# Patient Record
Sex: MALE | Race: WHITE | NOT HISPANIC OR LATINO | URBAN - METROPOLITAN AREA
[De-identification: names, ages, dates, MRNs, and addresses within clinical notes are randomized per-mention and may not be internally consistent; named-entity substitution may affect disease eponyms.]

---

## 2017-08-02 ENCOUNTER — EMERGENCY (EMERGENCY)
Facility: HOSPITAL | Age: 47
LOS: 0 days | Discharge: HOME | End: 2017-08-02
Admitting: INTERNAL MEDICINE

## 2017-08-02 DIAGNOSIS — S51.852A OPEN BITE OF LEFT FOREARM, INITIAL ENCOUNTER: ICD-10-CM

## 2017-08-02 DIAGNOSIS — Z23 ENCOUNTER FOR IMMUNIZATION: ICD-10-CM
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
## 2017-08-02 DIAGNOSIS — Z98.890 OTHER SPECIFIED POSTPROCEDURAL STATES: ICD-10-CM
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
## 2017-08-02 DIAGNOSIS — M62.82 RHABDOMYOLYSIS: ICD-10-CM

## 2017-08-02 DIAGNOSIS — J44.1 CHRONIC OBSTRUCTIVE PULMONARY DISEASE WITH (ACUTE) EXACERBATION: ICD-10-CM

## 2017-08-02 DIAGNOSIS — N17.9 ACUTE KIDNEY FAILURE, UNSPECIFIED: ICD-10-CM

## 2017-09-10 ENCOUNTER — INPATIENT (INPATIENT)
Facility: HOSPITAL | Age: 47
LOS: 0 days | Discharge: LEFT BEFORE DISCHARGE | End: 2017-09-10
Attending: INTERNAL MEDICINE

## 2017-09-10 DIAGNOSIS — N17.9 ACUTE KIDNEY FAILURE, UNSPECIFIED: ICD-10-CM

## 2017-09-10 DIAGNOSIS — J44.1 CHRONIC OBSTRUCTIVE PULMONARY DISEASE WITH (ACUTE) EXACERBATION: ICD-10-CM

## 2017-09-10 DIAGNOSIS — M62.82 RHABDOMYOLYSIS: ICD-10-CM

## 2017-09-19 DIAGNOSIS — J40 BRONCHITIS, NOT SPECIFIED AS ACUTE OR CHRONIC: ICD-10-CM

## 2017-09-19 DIAGNOSIS — J44.1 CHRONIC OBSTRUCTIVE PULMONARY DISEASE WITH (ACUTE) EXACERBATION: ICD-10-CM

## 2017-09-19 DIAGNOSIS — E78.5 HYPERLIPIDEMIA, UNSPECIFIED: ICD-10-CM

## 2017-09-19 DIAGNOSIS — K21.9 GASTRO-ESOPHAGEAL REFLUX DISEASE WITHOUT ESOPHAGITIS: ICD-10-CM

## 2017-09-19 DIAGNOSIS — G47.33 OBSTRUCTIVE SLEEP APNEA (ADULT) (PEDIATRIC): ICD-10-CM

## 2017-09-19 DIAGNOSIS — E11.9 TYPE 2 DIABETES MELLITUS WITHOUT COMPLICATIONS: ICD-10-CM

## 2017-09-19 DIAGNOSIS — F17.200 NICOTINE DEPENDENCE, UNSPECIFIED, UNCOMPLICATED: ICD-10-CM

## 2017-09-19 PROBLEM — Z00.00 ENCOUNTER FOR PREVENTIVE HEALTH EXAMINATION: Status: ACTIVE | Noted: 2017-09-19

## 2017-11-29 ENCOUNTER — EMERGENCY (EMERGENCY)
Facility: HOSPITAL | Age: 47
LOS: 0 days | Discharge: HOME | End: 2017-11-29

## 2017-11-29 DIAGNOSIS — J44.9 CHRONIC OBSTRUCTIVE PULMONARY DISEASE, UNSPECIFIED: ICD-10-CM

## 2017-11-29 DIAGNOSIS — Z48.02 ENCOUNTER FOR REMOVAL OF SUTURES: ICD-10-CM

## 2017-11-29 DIAGNOSIS — M62.82 RHABDOMYOLYSIS: ICD-10-CM
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
## 2017-11-29 DIAGNOSIS — S01.81XD LACERATION WITHOUT FOREIGN BODY OF OTHER PART OF HEAD, SUBSEQUENT ENCOUNTER: ICD-10-CM

## 2017-11-29 DIAGNOSIS — J44.1 CHRONIC OBSTRUCTIVE PULMONARY DISEASE WITH (ACUTE) EXACERBATION: ICD-10-CM

## 2017-11-29 DIAGNOSIS — Z87.891 PERSONAL HISTORY OF NICOTINE DEPENDENCE: ICD-10-CM

## 2017-11-29 DIAGNOSIS — Z98.890 OTHER SPECIFIED POSTPROCEDURAL STATES: ICD-10-CM

## 2017-11-29 DIAGNOSIS — N17.9 ACUTE KIDNEY FAILURE, UNSPECIFIED: ICD-10-CM

## 2017-11-29 DIAGNOSIS — Z88.0 ALLERGY STATUS TO PENICILLIN: ICD-10-CM

## 2017-12-03 ENCOUNTER — EMERGENCY (EMERGENCY)
Facility: HOSPITAL | Age: 47
LOS: 0 days | Discharge: HOME | End: 2017-12-03
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
## 2017-12-03 DIAGNOSIS — H11.412 VASCULAR ABNORMALITIES OF CONJUNCTIVA, LEFT EYE: ICD-10-CM

## 2017-12-03 DIAGNOSIS — T15.02XA FOREIGN BODY IN CORNEA, LEFT EYE, INITIAL ENCOUNTER: ICD-10-CM

## 2017-12-03 DIAGNOSIS — H57.12 OCULAR PAIN, LEFT EYE: ICD-10-CM

## 2017-12-03 DIAGNOSIS — J44.1 CHRONIC OBSTRUCTIVE PULMONARY DISEASE WITH (ACUTE) EXACERBATION: ICD-10-CM

## 2017-12-03 DIAGNOSIS — Z87.891 PERSONAL HISTORY OF NICOTINE DEPENDENCE: ICD-10-CM

## 2017-12-03 DIAGNOSIS — Z88.0 ALLERGY STATUS TO PENICILLIN: ICD-10-CM

## 2017-12-03 DIAGNOSIS — J44.9 CHRONIC OBSTRUCTIVE PULMONARY DISEASE, UNSPECIFIED: ICD-10-CM

## 2017-12-03 DIAGNOSIS — Z98.890 OTHER SPECIFIED POSTPROCEDURAL STATES: ICD-10-CM
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
## 2017-12-03 DIAGNOSIS — N17.9 ACUTE KIDNEY FAILURE, UNSPECIFIED: ICD-10-CM

## 2017-12-03 DIAGNOSIS — M62.82 RHABDOMYOLYSIS: ICD-10-CM
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
## 2018-03-04 ENCOUNTER — EMERGENCY (EMERGENCY)
Facility: HOSPITAL | Age: 48
LOS: 0 days | Discharge: HOME | End: 2018-03-04
Admitting: INTERNAL MEDICINE

## 2018-03-04 VITALS
RESPIRATION RATE: 18 BRPM | DIASTOLIC BLOOD PRESSURE: 78 MMHG | TEMPERATURE: 98 F | OXYGEN SATURATION: 98 % | SYSTOLIC BLOOD PRESSURE: 134 MMHG | HEART RATE: 97 BPM
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
## 2018-03-04 DIAGNOSIS — H57.12 OCULAR PAIN, LEFT EYE: ICD-10-CM

## 2018-03-04 DIAGNOSIS — T15.92XA FOREIGN BODY ON EXTERNAL EYE, PART UNSPECIFIED, LEFT EYE, INITIAL ENCOUNTER: ICD-10-CM
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
## 2018-03-04 DIAGNOSIS — Z88.0 ALLERGY STATUS TO PENICILLIN: ICD-10-CM

## 2018-03-04 DIAGNOSIS — J44.9 CHRONIC OBSTRUCTIVE PULMONARY DISEASE, UNSPECIFIED: ICD-10-CM

## 2018-03-04 RX ORDER — OFLOXACIN 0.3 %
1 DROPS OPHTHALMIC (EYE) ONCE
Qty: 0 | Refills: 0 | Status: COMPLETED | OUTPATIENT
Start: 2018-03-04 | End: 2018-03-04

## 2018-03-04 RX ADMIN — Medication 1 DROP(S): at 20:37

## 2018-03-04 NOTE — ED ADULT TRIAGE NOTE - CHIEF COMPLAINT QUOTE
pt sts around abhi time he was cutting meta bleachers and had pieces of metal go into his left eye which where removed by eye doctor but the past two days the left eye is red again and has been hurting him.

## 2018-03-04 NOTE — ED PROVIDER NOTE - MEDICAL DECISION MAKING DETAILS
Patient c/o left eye FB sensation, + FB in cornea, unable to scrap off. 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 Will follow up with optho tomorrow morning. Understands return precautions. VA at baseline. Patient given abx eye drops.

## 2018-03-04 NOTE — ED PROVIDER NOTE - NS ED ROS FT
Constitutional: no fever, chills   Eyes: + left eye FB sensation, erythema, and sensitivity to light.  no visual changes, no eye pain, no discharge. no eye trauma. + h/o multiple FB left eye  ENT: no URI sxs, no throat pain  Cardiovascular: no chest pain, no sob  Respiratory: no cough, no shortness of breath,  Gastrointestinal: no nausea, vomiting  Integumentary: no rash or skin changes. no edema  Neurological: no headache, no dizziness, no visual changes, no UE/LE weakness or paresthesias.

## 2018-03-04 NOTE — ED PROVIDER NOTE - OBJECTIVE STATEMENT
46 yo male with h/o COPD presents to the ED c/o left eye FB sensation x 2 days. Patient states he has a complicated history of his left eye - he had metal FBs in December, requiring multiple procedures - states retrained metal is still in his eye. Patient was suppose to follow up with optho but hasn't followed up. Patient c/o eye redness, sensitivity to light and FB sensation left eye. no trauma or injury to eye. Denies fever, chills, headache, dizziness, visual changes (from baseline) UE/LE weakness or paresthesias. baseline VA 20/80 left eye.

## 2018-03-04 NOTE — ED PROVIDER NOTE - PROGRESS NOTE DETAILS
Patient understands important of follow up with optho tomorrow morning. Understands return precautions.

## 2018-03-04 NOTE — ED PROVIDER NOTE - PHYSICAL EXAMINATION
GENERAL:  well appearing, non-toxic male in no acute distress  SKIN: skin warm, pink and dry. MMM.   HEAD: NC, AT  EYE: Normal lids. PERRL, EOMI. no pain with eye movement. + erythema to sclera. + FB of cornea. VA 20/40 right eye, 20/70 left eye. eye anesthesized and stained, + uptake surrounding FB. neg wilmer sign. eye lids everted, no FB under eyelids.   ENT:  Airway intact. Patent oropharynx without erythema or exudate.  NECK: Neck supple. No midline cervical tenderness, meningismus, or JVD. Trachea midline  PULM: CTAB. Normal respiratory effort. No respiratory distress. No wheezes, stridor, rales or rhonchi. No retractions  CV: RRR, no M/R/G.   NEURO: A+Ox3, no sensory/motor deficits, CN II-XII intact.

## 2018-03-05 ENCOUNTER — EMERGENCY (EMERGENCY)
Facility: HOSPITAL | Age: 48
LOS: 0 days | Discharge: HOME | End: 2018-03-05
Attending: EMERGENCY MEDICINE | Admitting: INTERNAL MEDICINE

## 2018-03-05 VITALS
DIASTOLIC BLOOD PRESSURE: 79 MMHG | SYSTOLIC BLOOD PRESSURE: 130 MMHG | OXYGEN SATURATION: 97 % | HEART RATE: 104 BPM | RESPIRATION RATE: 18 BRPM | TEMPERATURE: 99 F
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
## 2018-03-05 DIAGNOSIS — T15.02XA FOREIGN BODY IN CORNEA, LEFT EYE, INITIAL ENCOUNTER: ICD-10-CM
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
## 2018-03-05 DIAGNOSIS — Z88.0 ALLERGY STATUS TO PENICILLIN: ICD-10-CM

## 2018-03-05 DIAGNOSIS — H57.12 OCULAR PAIN, LEFT EYE: ICD-10-CM

## 2018-03-05 RX ORDER — FLUORESCEIN SODIUM 9 MG
1 STRIP OPHTHALMIC (EYE) ONCE
Qty: 0 | Refills: 0 | Status: COMPLETED | OUTPATIENT
Start: 2018-03-05 | End: 2018-03-05

## 2018-03-05 RX ADMIN — Medication 1 APPLICATION(S): at 15:57

## 2018-03-05 RX ADMIN — Medication 1 DROP(S): at 16:20

## 2018-03-05 NOTE — ED PROVIDER NOTE - OBJECTIVE STATEMENT
48 yo M w/ a hx of COPD presents to the ED c/o left eye FB sensation x 3 days. Patient states he has a complicated history of his left eye - he had metal FBs in December, requiring multiple procedures - states retained metal is still in his eye. Patient was suppose to follow up with optho at [facility] to have it removed but never did because the pain went away until 3 days ago. Patient c/o eye redness, sensitivity to light and FB sensation left eye. no trauma or injury to eye. He was evaluated here last night that confirmed these findings and was told to f/u at ophtho clinic but patient returned here instead. Denies fever, chills, headache, dizziness, visual changes.

## 2018-03-05 NOTE — ED PROVIDER NOTE - ATTENDING CONTRIBUTION TO CARE
Pt is a 46yo male with L eye foreign body since December that was partially removed and he was to follow up with [facility] but he didn't.  3d ago started getting pain to same eye.  Came to ED last night and got abx.     Exam: L eye retained body, EOMI, neg Stephan  Imp: foreign body  Plan: ophtho f/u

## 2018-03-05 NOTE — ED PROVIDER NOTE - NS ED ROS FT
Constitutional: See HPI.  Eyes: + eye pain . Nodischarge.  ENMT: No hearing changes, pain, discharge or infections. No neck pain or stiffness.  Neuro: No weakness. No LOC.  Skin: No skin rash.

## 2018-03-05 NOTE — ED PROVIDER NOTE - PROGRESS NOTE DETAILS
[facility]. Dr. Campos unable to see him today, patient can be seen first thing in the AM at 8:30. This was relayed to the patient and patient understood that he is to go straight to the clinic tomorrow morning and NOT the ED

## 2018-03-05 NOTE — ED PROVIDER NOTE - PHYSICAL EXAMINATION
AOx4, Non toxic appearing, NAD, speaking in full sentences. Skin  warm and dry, no acute rash. Head normocephalic, atraumatic. PERRLA/EOMI, conjunctiva and sclera clear on right, left conjunctiva injected, on stain - FB seen in 9-10 o clock position with negative seidels.

## 2018-03-06 ENCOUNTER — OUTPATIENT (OUTPATIENT)
Dept: OUTPATIENT SERVICES | Facility: HOSPITAL | Age: 48
LOS: 1 days | Discharge: HOME | End: 2018-03-06

## 2018-03-09 ENCOUNTER — OUTPATIENT (OUTPATIENT)
Dept: OUTPATIENT SERVICES | Facility: HOSPITAL | Age: 48
LOS: 1 days | Discharge: HOME | End: 2018-03-09

## 2018-03-16 ENCOUNTER — OUTPATIENT (OUTPATIENT)
Dept: OUTPATIENT SERVICES | Facility: HOSPITAL | Age: 48
LOS: 1 days | Discharge: HOME | End: 2018-03-16

## 2018-03-20 ENCOUNTER — OUTPATIENT (OUTPATIENT)
Dept: OUTPATIENT SERVICES | Facility: HOSPITAL | Age: 48
LOS: 1 days | Discharge: HOME | End: 2018-03-20

## 2018-03-23 ENCOUNTER — OUTPATIENT (OUTPATIENT)
Dept: OUTPATIENT SERVICES | Facility: HOSPITAL | Age: 48
LOS: 1 days | Discharge: HOME | End: 2018-03-23

## 2018-03-29 ENCOUNTER — OUTPATIENT (OUTPATIENT)
Dept: OUTPATIENT SERVICES | Facility: HOSPITAL | Age: 48
LOS: 1 days | Discharge: HOME | End: 2018-03-29

## 2018-04-03 ENCOUNTER — OUTPATIENT (OUTPATIENT)
Dept: OUTPATIENT SERVICES | Facility: HOSPITAL | Age: 48
LOS: 1 days | Discharge: HOME | End: 2018-04-03

## 2018-04-10 ENCOUNTER — OUTPATIENT (OUTPATIENT)
Dept: OUTPATIENT SERVICES | Facility: HOSPITAL | Age: 48
LOS: 1 days | Discharge: HOME | End: 2018-04-10

## 2018-04-17 ENCOUNTER — OUTPATIENT (OUTPATIENT)
Dept: OUTPATIENT SERVICES | Facility: HOSPITAL | Age: 48
LOS: 1 days | Discharge: HOME | End: 2018-04-17

## 2018-04-17 DIAGNOSIS — H18.223 IDIOPATHIC CORNEAL EDEMA, BILATERAL: ICD-10-CM

## 2018-04-17 DIAGNOSIS — H18.792 OTHER CORNEAL DEFORMITIES, LEFT EYE: ICD-10-CM

## 2018-04-24 ENCOUNTER — OUTPATIENT (OUTPATIENT)
Dept: OUTPATIENT SERVICES | Facility: HOSPITAL | Age: 48
LOS: 1 days | Discharge: HOME | End: 2018-04-24

## 2018-05-08 ENCOUNTER — OUTPATIENT (OUTPATIENT)
Dept: OUTPATIENT SERVICES | Facility: HOSPITAL | Age: 48
LOS: 1 days | Discharge: HOME | End: 2018-05-08

## 2018-05-11 NOTE — ED ADULT NURSE NOTE - EXTENSIONS OF SELF_ADULT
Medical Necessity Clause: This procedure was medically necessary because the lesions that were treated were: Medical Necessity Information: It is in your best interest to select a reason for this procedure from the list below. All of these items fulfill various CMS LCD requirements except the new and changing color options. Render Post-Care Instructions In Note?: no Consent: The patient's consent was obtained including but not limited to risks of crusting, scabbing, blistering, scarring, darker or lighter pigmentary change, recurrence, incomplete removal and infection. Detail Level: Detailed None Post-Care Instructions: I reviewed with the patient in detail post-care instructions. Patient is to wear sunprotection, and avoid picking at any of the treated lesions. Pt may apply Vaseline to crusted or scabbing areas. Size Of Lesion In Cm (Optional): 0 Detail Level: Zone

## 2018-05-19 ENCOUNTER — EMERGENCY (EMERGENCY)
Facility: HOSPITAL | Age: 48
LOS: 0 days | Discharge: HOME | End: 2018-05-20
Attending: EMERGENCY MEDICINE | Admitting: EMERGENCY MEDICINE

## 2018-05-19 VITALS
RESPIRATION RATE: 20 BRPM | TEMPERATURE: 99 F | OXYGEN SATURATION: 94 % | DIASTOLIC BLOOD PRESSURE: 60 MMHG | HEART RATE: 104 BPM | SYSTOLIC BLOOD PRESSURE: 119 MMHG

## 2018-05-19 DIAGNOSIS — R11.10 VOMITING, UNSPECIFIED: ICD-10-CM

## 2018-05-19 DIAGNOSIS — R06.02 SHORTNESS OF BREATH: ICD-10-CM

## 2018-05-19 DIAGNOSIS — Z88.0 ALLERGY STATUS TO PENICILLIN: ICD-10-CM

## 2018-05-19 DIAGNOSIS — F17.200 NICOTINE DEPENDENCE, UNSPECIFIED, UNCOMPLICATED: ICD-10-CM

## 2018-05-19 DIAGNOSIS — J44.1 CHRONIC OBSTRUCTIVE PULMONARY DISEASE WITH (ACUTE) EXACERBATION: ICD-10-CM

## 2018-05-20 LAB
ALBUMIN SERPL ELPH-MCNC: 3.8 G/DL — SIGNIFICANT CHANGE UP (ref 3.5–5.2)
ALP SERPL-CCNC: 87 U/L — SIGNIFICANT CHANGE UP (ref 30–115)
ALT FLD-CCNC: 23 U/L — SIGNIFICANT CHANGE UP (ref 0–41)
ANION GAP SERPL CALC-SCNC: 12 MMOL/L — SIGNIFICANT CHANGE UP (ref 7–14)
AST SERPL-CCNC: 25 U/L — SIGNIFICANT CHANGE UP (ref 0–41)
BASOPHILS # BLD AUTO: 0.04 K/UL — SIGNIFICANT CHANGE UP (ref 0–0.2)
BASOPHILS NFR BLD AUTO: 0.4 % — SIGNIFICANT CHANGE UP (ref 0–1)
BILIRUB SERPL-MCNC: 0.3 MG/DL — SIGNIFICANT CHANGE UP (ref 0.2–1.2)
BUN SERPL-MCNC: 21 MG/DL — HIGH (ref 10–20)
CALCIUM SERPL-MCNC: 8.6 MG/DL — SIGNIFICANT CHANGE UP (ref 8.5–10.1)
CHLORIDE SERPL-SCNC: 99 MMOL/L — SIGNIFICANT CHANGE UP (ref 98–110)
CK MB CFR SERPL CALC: 6.2 NG/ML — SIGNIFICANT CHANGE UP (ref 0.6–6.3)
CK SERPL-CCNC: 626 U/L — HIGH (ref 0–225)
CO2 SERPL-SCNC: 26 MMOL/L — SIGNIFICANT CHANGE UP (ref 17–32)
CREAT SERPL-MCNC: 0.9 MG/DL — SIGNIFICANT CHANGE UP (ref 0.7–1.5)
EOSINOPHIL # BLD AUTO: 0.16 K/UL — SIGNIFICANT CHANGE UP (ref 0–0.7)
EOSINOPHIL NFR BLD AUTO: 1.6 % — SIGNIFICANT CHANGE UP (ref 0–8)
GLUCOSE SERPL-MCNC: 113 MG/DL — HIGH (ref 70–99)
HCT VFR BLD CALC: 45.4 % — SIGNIFICANT CHANGE UP (ref 42–52)
HGB BLD-MCNC: 15 G/DL — SIGNIFICANT CHANGE UP (ref 14–18)
IMM GRANULOCYTES NFR BLD AUTO: 0.4 % — HIGH (ref 0.1–0.3)
LYMPHOCYTES # BLD AUTO: 1.9 K/UL — SIGNIFICANT CHANGE UP (ref 1.2–3.4)
LYMPHOCYTES # BLD AUTO: 19.6 % — LOW (ref 20.5–51.1)
MCHC RBC-ENTMCNC: 29.4 PG — SIGNIFICANT CHANGE UP (ref 27–31)
MCHC RBC-ENTMCNC: 33 G/DL — SIGNIFICANT CHANGE UP (ref 32–37)
MCV RBC AUTO: 89 FL — SIGNIFICANT CHANGE UP (ref 80–94)
MONOCYTES # BLD AUTO: 0.91 K/UL — HIGH (ref 0.1–0.6)
MONOCYTES NFR BLD AUTO: 9.4 % — HIGH (ref 1.7–9.3)
NEUTROPHILS # BLD AUTO: 6.66 K/UL — HIGH (ref 1.4–6.5)
NEUTROPHILS NFR BLD AUTO: 68.6 % — SIGNIFICANT CHANGE UP (ref 42.2–75.2)
NRBC # BLD: 0 /100 WBCS — SIGNIFICANT CHANGE UP (ref 0–0)
PLATELET # BLD AUTO: 204 K/UL — SIGNIFICANT CHANGE UP (ref 130–400)
POTASSIUM SERPL-MCNC: 4.4 MMOL/L — SIGNIFICANT CHANGE UP (ref 3.5–5)
POTASSIUM SERPL-SCNC: 4.4 MMOL/L — SIGNIFICANT CHANGE UP (ref 3.5–5)
PROT SERPL-MCNC: 6.7 G/DL — SIGNIFICANT CHANGE UP (ref 6–8)
RBC # BLD: 5.1 M/UL — SIGNIFICANT CHANGE UP (ref 4.7–6.1)
RBC # FLD: 13.3 % — SIGNIFICANT CHANGE UP (ref 11.5–14.5)
SODIUM SERPL-SCNC: 137 MMOL/L — SIGNIFICANT CHANGE UP (ref 135–146)
TROPONIN T SERPL-MCNC: <0.01 NG/ML — SIGNIFICANT CHANGE UP
WBC # BLD: 9.71 K/UL — SIGNIFICANT CHANGE UP (ref 4.8–10.8)
WBC # FLD AUTO: 9.71 K/UL — SIGNIFICANT CHANGE UP (ref 4.8–10.8)

## 2018-05-20 RX ORDER — ALBUTEROL 90 UG/1
1 AEROSOL, METERED ORAL ONCE
Qty: 0 | Refills: 0 | Status: COMPLETED | OUTPATIENT
Start: 2018-05-20 | End: 2018-05-20

## 2018-05-20 RX ORDER — IPRATROPIUM/ALBUTEROL SULFATE 18-103MCG
3 AEROSOL WITH ADAPTER (GRAM) INHALATION
Qty: 0 | Refills: 0 | Status: COMPLETED | OUTPATIENT
Start: 2018-05-20 | End: 2018-05-20

## 2018-05-20 RX ORDER — DEXAMETHASONE 0.5 MG/5ML
10 ELIXIR ORAL ONCE
Qty: 0 | Refills: 0 | Status: COMPLETED | OUTPATIENT
Start: 2018-05-20 | End: 2018-05-20

## 2018-05-20 RX ADMIN — Medication 3 MILLILITER(S): at 01:20

## 2018-05-20 RX ADMIN — Medication 3 MILLILITER(S): at 01:35

## 2018-05-20 RX ADMIN — ALBUTEROL 1 PUFF(S): 90 AEROSOL, METERED ORAL at 04:01

## 2018-05-20 RX ADMIN — Medication 10 MILLIGRAM(S): at 01:20

## 2018-05-20 RX ADMIN — Medication 3 MILLILITER(S): at 01:13

## 2018-05-20 NOTE — ED PROVIDER NOTE - OBJECTIVE STATEMENT
48y M w [facility] COPD diagnosed 6 mo ago presents for 2 wks progressively worsening SOB. Had been well controlled at home with daily ipratropium/albuterol nebulizer treatments for which he used his  grandmother's nebulizer. Yesterday, the nebulizer broke and he is still having trouble breathing. +persistent cough with occasional white sputum and 3 episodes of posttussive vomiting.   No nausea or fever.   States that his albuterol inhaler ran out.  No pulmonologist.

## 2018-05-20 NOTE — ED PROVIDER NOTE - MEDICAL DECISION MAKING DETAILS
48m w COPD exacerbation. Labs and imaging reviewed. Feeling better after treatment. Pt advised regarding symptomatic/supportive care, importance of PMD/Pulmonary f/u, and symptoms to prompt ED return.

## 2018-05-20 NOTE — ED PROVIDER NOTE - NS ED ROS FT
Constitutional: No fever or chills. Decreased appetite. No unintended weight loss.   Eyes: No vision changes.  ENT: No ear pain. No sore throat.  Neck: No neck pain or stiffness.  Cardiovascular: No chest pain, palpitations, or edema.  Pulmonary: No hemoptysis.  Abdominal: No abdominal pain, nausea, vomiting, or diarrhea.   Neuro: No headache, syncope, or dizziness.  MS: No back pain. No leg swelling or calf pain.   Psych: No suicidal or homicidal ideations.

## 2018-05-20 NOTE — ED PROVIDER NOTE - PHYSICAL EXAMINATION
Constitutional: Well developed, well nourished. NAD. Good general hygiene  Head: Atraumatic.  Eyes: EOMI without discomfort.   ENT: No nasal discharge. Mucous membranes moist.  Neck: Supple. Painless ROM.  Cardiovascular: Regular rhythm. Regular rate. Normal S1 and S2. No murmurs. 2+ pulses in all extremities.   Pulmonary: Increased respiratory rate and effort. Diffuse wheezes bilaterally without rales or rhonchi. Bilateral, equal lung expansion.   Abdominal: Soft. Nondistended. Nontender. No rebound or guarding.   Extremities. Ambulatory.  Skin: No rashes.   Neuro: AAOx3. No focal neurological deficits.  Psych: Normal mood. Normal affect.

## 2018-05-20 NOTE — ED PROVIDER NOTE - ATTENDING CONTRIBUTION TO CARE
48m w a hx of COPD presents w 2 wks of progressively worsening dyspnea, wheezing, & cough w white sputum. Symptoms are constant, moderate, no exacerbating/alleviating. No recent travel/hosp/immobilization.    Review of Systems  Constitutional:  No fever or chills.  Eyes:  Negative.   ENMT:  No nasal congestion, discharge, or throat pain.   Cardiac:  No chest pain, syncope, or edema.  Respiratory:  +dyspnea, +wheezing, +cough. No hemoptysis.  GI:  No vomiting, diarrhea, or abdominal pain. No melena or hematochezia.  :  No dysuria or hematuria.   Musculoskeletal:  No gait abnormality, joint swelling, joint pain, or back pain.  Skin:  No skin rash, jaundice, or lesions.  Neuro:  No headache, loss of sensation, or focal weakness.  No change in mental status.     Physical Exam  General: Awake, alert, NAD, WDWN, non-toxic appearing, NCAT  Eyes: PERRL, EOMI, no icterus, lids and conjunctivae are normal  ENT: External inspection normal, pink/moist membranes, pharynx normal  CV: S1S2, regular rate and rhythm, no murmur/gallops/rubs, no JVD, 2+ pulses b/l, no edema/cords/homans, warm/well-perfused  Respiratory: Normal respiratory rate/effort, no respiratory distress, normal voice, speaking full sentences, lungs wheezing diffusely b/l, no rales/rhonchi, no retractions, no stridor  Abdomen: Soft abdomen, no tender/distended/guarding/rebound, no CVA tender  Musculoskeletal: FROM all 4 extremities, N/V intact, stable gait  Neck: FROM neck, supple, no meningismus, trachea midline, no JVD  Integumentary: Color normal for race, warm and dry, no rash  Neuro: Oriented x3, CN 2-12 grossly intact, normal motor, normal sensory  Psych: Oriented x3, mood normal, affect normal     48m w COPD exacerbation, nontoxic appearing, n/v intact. --CBC, CMP, EKG, enzymes, CXR. --Nebs, steroids, observe/re-assess.

## 2018-05-20 NOTE — ED ADULT NURSE NOTE - OBJECTIVE STATEMENT
pt presents to ed with hx of copd c/o sob states inhaler is not working, cough with white phlegm, pt states he is a + smoker for 35 years

## 2018-05-21 ENCOUNTER — INPATIENT (INPATIENT)
Facility: HOSPITAL | Age: 48
LOS: 1 days | Discharge: HOME | End: 2018-05-23
Attending: INTERNAL MEDICINE | Admitting: INTERNAL MEDICINE

## 2018-05-21 VITALS
OXYGEN SATURATION: 97 % | RESPIRATION RATE: 22 BRPM | DIASTOLIC BLOOD PRESSURE: 63 MMHG | TEMPERATURE: 98 F | HEART RATE: 110 BPM | SYSTOLIC BLOOD PRESSURE: 130 MMHG

## 2018-05-21 DIAGNOSIS — E66.01 MORBID (SEVERE) OBESITY DUE TO EXCESS CALORIES: ICD-10-CM

## 2018-05-21 DIAGNOSIS — Z96.659 PRESENCE OF UNSPECIFIED ARTIFICIAL KNEE JOINT: Chronic | ICD-10-CM

## 2018-05-21 DIAGNOSIS — R06.02 SHORTNESS OF BREATH: ICD-10-CM

## 2018-05-21 DIAGNOSIS — Z96.659 PRESENCE OF UNSPECIFIED ARTIFICIAL KNEE JOINT: ICD-10-CM

## 2018-05-21 DIAGNOSIS — F17.210 NICOTINE DEPENDENCE, CIGARETTES, UNCOMPLICATED: ICD-10-CM

## 2018-05-21 DIAGNOSIS — Z88.0 ALLERGY STATUS TO PENICILLIN: ICD-10-CM

## 2018-05-21 DIAGNOSIS — J44.1 CHRONIC OBSTRUCTIVE PULMONARY DISEASE WITH (ACUTE) EXACERBATION: ICD-10-CM

## 2018-05-21 LAB
ANION GAP SERPL CALC-SCNC: 13 MMOL/L — SIGNIFICANT CHANGE UP (ref 7–14)
APTT BLD: 20.7 SEC — CRITICAL LOW (ref 27–39.2)
BASE EXCESS BLDV CALC-SCNC: 3.1 MMOL/L — HIGH (ref -2–2)
BASOPHILS # BLD AUTO: 0.04 K/UL — SIGNIFICANT CHANGE UP (ref 0–0.2)
BASOPHILS NFR BLD AUTO: 0.4 % — SIGNIFICANT CHANGE UP (ref 0–1)
BUN SERPL-MCNC: 19 MG/DL — SIGNIFICANT CHANGE UP (ref 10–20)
CA-I SERPL-SCNC: 1.16 MMOL/L — SIGNIFICANT CHANGE UP (ref 1.12–1.3)
CALCIUM SERPL-MCNC: 8.4 MG/DL — LOW (ref 8.5–10.1)
CHLORIDE SERPL-SCNC: 103 MMOL/L — SIGNIFICANT CHANGE UP (ref 98–110)
CO2 SERPL-SCNC: 27 MMOL/L — SIGNIFICANT CHANGE UP (ref 17–32)
CREAT SERPL-MCNC: 0.9 MG/DL — SIGNIFICANT CHANGE UP (ref 0.7–1.5)
EOSINOPHIL # BLD AUTO: 0.05 K/UL — SIGNIFICANT CHANGE UP (ref 0–0.7)
EOSINOPHIL NFR BLD AUTO: 0.5 % — SIGNIFICANT CHANGE UP (ref 0–8)
GAS PNL BLDV: 140 MMOL/L — SIGNIFICANT CHANGE UP (ref 136–145)
GAS PNL BLDV: SIGNIFICANT CHANGE UP
GAS PNL BLDV: SIGNIFICANT CHANGE UP
GLUCOSE SERPL-MCNC: 104 MG/DL — HIGH (ref 70–99)
HCO3 BLDV-SCNC: 29 MMOL/L — SIGNIFICANT CHANGE UP (ref 22–29)
HCT VFR BLD CALC: 44.8 % — SIGNIFICANT CHANGE UP (ref 42–52)
HCT VFR BLDA CALC: 47.5 % — HIGH (ref 34–44)
HGB BLD CALC-MCNC: 15.5 G/DL — SIGNIFICANT CHANGE UP (ref 14–18)
HGB BLD-MCNC: 14.6 G/DL — SIGNIFICANT CHANGE UP (ref 14–18)
IMM GRANULOCYTES NFR BLD AUTO: 0.5 % — HIGH (ref 0.1–0.3)
INR BLD: 1.03 RATIO — SIGNIFICANT CHANGE UP (ref 0.65–1.3)
LACTATE BLDV-MCNC: 1.4 MMOL/L — SIGNIFICANT CHANGE UP (ref 0.5–1.6)
LYMPHOCYTES # BLD AUTO: 1.72 K/UL — SIGNIFICANT CHANGE UP (ref 1.2–3.4)
LYMPHOCYTES # BLD AUTO: 17.6 % — LOW (ref 20.5–51.1)
MCHC RBC-ENTMCNC: 29.3 PG — SIGNIFICANT CHANGE UP (ref 27–31)
MCHC RBC-ENTMCNC: 32.6 G/DL — SIGNIFICANT CHANGE UP (ref 32–37)
MCV RBC AUTO: 90 FL — SIGNIFICANT CHANGE UP (ref 80–94)
MONOCYTES # BLD AUTO: 0.88 K/UL — HIGH (ref 0.1–0.6)
MONOCYTES NFR BLD AUTO: 9 % — SIGNIFICANT CHANGE UP (ref 1.7–9.3)
NEUTROPHILS # BLD AUTO: 7.04 K/UL — HIGH (ref 1.4–6.5)
NEUTROPHILS NFR BLD AUTO: 72 % — SIGNIFICANT CHANGE UP (ref 42.2–75.2)
PCO2 BLDV: 46 MMHG — SIGNIFICANT CHANGE UP (ref 41–51)
PH BLDV: 7.4 — SIGNIFICANT CHANGE UP (ref 7.26–7.43)
PLATELET # BLD AUTO: 181 K/UL — SIGNIFICANT CHANGE UP (ref 130–400)
PO2 BLDV: 49 MMHG — HIGH (ref 20–40)
POTASSIUM BLDV-SCNC: 3.8 MMOL/L — SIGNIFICANT CHANGE UP (ref 3.3–5.6)
POTASSIUM SERPL-MCNC: 4 MMOL/L — SIGNIFICANT CHANGE UP (ref 3.5–5)
POTASSIUM SERPL-SCNC: 4 MMOL/L — SIGNIFICANT CHANGE UP (ref 3.5–5)
PROTHROM AB SERPL-ACNC: 11.1 SEC — SIGNIFICANT CHANGE UP (ref 9.95–12.87)
RBC # BLD: 4.98 M/UL — SIGNIFICANT CHANGE UP (ref 4.7–6.1)
RBC # FLD: 13.8 % — SIGNIFICANT CHANGE UP (ref 11.5–14.5)
SAO2 % BLDV: 86 % — SIGNIFICANT CHANGE UP
SODIUM SERPL-SCNC: 143 MMOL/L — SIGNIFICANT CHANGE UP (ref 135–146)
TROPONIN T SERPL-MCNC: <0.01 NG/ML — SIGNIFICANT CHANGE UP
WBC # BLD: 9.78 K/UL — SIGNIFICANT CHANGE UP (ref 4.8–10.8)
WBC # FLD AUTO: 9.78 K/UL — SIGNIFICANT CHANGE UP (ref 4.8–10.8)

## 2018-05-21 RX ORDER — IPRATROPIUM/ALBUTEROL SULFATE 18-103MCG
3 AEROSOL WITH ADAPTER (GRAM) INHALATION
Qty: 0 | Refills: 0 | COMMUNITY

## 2018-05-21 RX ORDER — ALBUTEROL 90 UG/1
2.5 AEROSOL, METERED ORAL ONCE
Qty: 0 | Refills: 0 | Status: COMPLETED | OUTPATIENT
Start: 2018-05-21 | End: 2018-05-21

## 2018-05-21 RX ORDER — AZITHROMYCIN 500 MG/1
500 TABLET, FILM COATED ORAL ONCE
Qty: 0 | Refills: 0 | Status: COMPLETED | OUTPATIENT
Start: 2018-05-21 | End: 2018-05-21

## 2018-05-21 RX ORDER — HEPARIN SODIUM 5000 [USP'U]/ML
5000 INJECTION INTRAVENOUS; SUBCUTANEOUS EVERY 8 HOURS
Qty: 0 | Refills: 0 | Status: DISCONTINUED | OUTPATIENT
Start: 2018-05-21 | End: 2018-05-23

## 2018-05-21 RX ORDER — HYDROXYZINE HCL 10 MG
25 TABLET ORAL AT BEDTIME
Qty: 0 | Refills: 0 | Status: DISCONTINUED | OUTPATIENT
Start: 2018-05-21 | End: 2018-05-23

## 2018-05-21 RX ORDER — IPRATROPIUM/ALBUTEROL SULFATE 18-103MCG
3 AEROSOL WITH ADAPTER (GRAM) INHALATION EVERY 6 HOURS
Qty: 0 | Refills: 0 | Status: DISCONTINUED | OUTPATIENT
Start: 2018-05-21 | End: 2018-05-23

## 2018-05-21 RX ADMIN — ALBUTEROL 2.5 MILLIGRAM(S): 90 AEROSOL, METERED ORAL at 18:26

## 2018-05-21 RX ADMIN — Medication 60 MILLIGRAM(S): at 22:35

## 2018-05-21 RX ADMIN — ALBUTEROL 2.5 MILLIGRAM(S): 90 AEROSOL, METERED ORAL at 16:15

## 2018-05-21 RX ADMIN — Medication 125 MILLIGRAM(S): at 16:15

## 2018-05-21 RX ADMIN — AZITHROMYCIN 255 MILLIGRAM(S): 500 TABLET, FILM COATED ORAL at 16:35

## 2018-05-21 NOTE — ED PROVIDER NOTE - PHYSICAL EXAMINATION
PHYSICAL EXAM:    T(F): 98.4, Max: 98.4 (05-21-18 @ 13:37)  HR: 110  BP: 130/63  BP(mean): --  RR: 22  SpO2: 97%  GENERAL: NAD, well-developed  HEAD:  Atraumatic, Normocephalic  EYES: EOMI, PERRLA, conjunctiva and sclera clear  NECK: Supple, No JVD  CHEST/LUNG: bilateral diffuse wheezing, no crackles  HEART: Regular rate and rhythm; No murmurs, rubs, or gallops  ABDOMEN: Soft, Nontender, Nondistended; Bowel sounds present  EXTREMITIES:  2+ Peripheral Pulses, No clubbing, cyanosis, or edema  PSYCH: AAOx3  NEUROLOGY: non-focal  SKIN: No rashes or lesions

## 2018-05-21 NOTE — ED PROVIDER NOTE - OBJECTIVE STATEMENT
48 year old M 30 pack year smoker diagnosed with COPD 6 months ago presenting for worsening shortness of breath on minimal exertion associated with audible wheezing, and cough productive of greenish sputum. pt visited ED 1 day ptp for shortness of breath attributed to noncompliance with nebulizer at home. Pt given nebulizer prescriptions and sent home with OP follow up. Pt now had recurring shortness of breath with no improvement with use of nebulizer and inhaler at home. Pt denies any fever, chills or chest pain. Pt has no pulmonologist. Likely COPD exacerbation. will check xray, ecg, labs, give oxygen, iv solumedrol and nebulizers and re-assess.

## 2018-05-21 NOTE — H&P ADULT - NSHPLABSRESULTS_GEN_ALL_CORE
14.6   9.78  )-----------( 181      ( 21 May 2018 16:36 )             44.8     05-21    143  |  103  |  19  ----------------------------<  104<H>  4.0   |  27  |  0.9    Ca    8.4<L>      21 May 2018 16:36    TPro  6.7  /  Alb  3.8  /  TBili  0.3  /  DBili  x   /  AST  25  /  ALT  23  /  AlkPhos  87  05-20    CARDIAC MARKERS ( 21 May 2018 16:36 )  x     / <0.01 ng/mL / x     / x     / x      CARDIAC MARKERS ( 20 May 2018 01:12 )  x     / <0.01 ng/mL / 626 U/L / x     / 6.2 ng/mL    < from: Xray Chest 1 View AP/PA (05.20.18 @ 01:51) >  No radiographic evidence of acute cardiopulmonary disease.  < end of copied text >

## 2018-05-21 NOTE — ED PROVIDER NOTE - ATTENDING CONTRIBUTION TO CARE
48M PMH COPD, smoker, no home o2, p/w wheezing, prod cough, SOB x 1 week. pt seen in ED yesterday for copd exac, given decadron IM, albuterol inhaler and dc home. no fever, chills. states he quit smoking x 2 days. no le edema, batista. never intubated. no cp. on exam, AFVSS, well jaky nad, ncat, eomi, perrla, mmm, diffuse wheezing and rhonchi bilat w inc WOB, +coughing, rrr nl s1s2 no mrg, abd soft ntnd, aaox3, no focal deficits, no le edema or calf ttp, a/p; COPD exac, will do nebs, steroids, azithromycin, and re-eval. CXR, ekg/trop, labs, bnp, vbg and re-eval 48M PMH COPD, smoker, no home o2, p/w wheezing, prod cough, SOB x 1 week. pt seen in ED yesterday for copd exac, given decadron IM, albuterol inhaler and dc home. no fever, chills. states he quit smoking x 2 days. no le edema, batista. never intubated. no cp. on exam, AFVSS, well jaky nad, ncat, eomi, perrla, mmm, diffuse wheezing and rhonchi bilat w inc WOB, +coughing, rrr nl s1s2 no mrg, abd soft ntnd, aaox3, no focal deficits, no le edema or calf ttp, a/p; COPD exac, will do nebs, steroids, azithromycin, and re-eval. CXR, ekg/trop, labs, vbg and re-eval. H&P not consistent w pe, dissection, PTX, CHF. r/o acs, r/o PNA

## 2018-05-21 NOTE — H&P ADULT - NSHPPHYSICALEXAM_GEN_ALL_CORE
Vital Signs Last 24 Hrs  T(C): 36.7 (21 May 2018 15:46), Max: 36.9 (21 May 2018 13:37)  T(F): 98.1 (21 May 2018 15:46), Max: 98.4 (21 May 2018 13:37)  HR: 98 (21 May 2018 15:46) (98 - 110)  BP: 125/61 (21 May 2018 15:46) (125/61 - 130/63)  BP(mean): --  RR: 20 (21 May 2018 15:46) (20 - 22)  SpO2: 98% (21 May 2018 15:46) (97% - 98%)    Gen: NAD, AAOx3  CV:  +S1, S2  Pulm: rhonchorous breath sounds  Abd: obese, soft, ND, NT  Ext: no c/c/e

## 2018-05-21 NOTE — H&P ADULT - ATTENDING COMMENTS
48 year old M PMHx COPD and active smoker (1/2 ppd) admitted for worsening dyspnea secondary to acute COPD exacerbation.    SOB secondary to acute COPD exacerbation: admit to medicine.  Pulmonology evaluation requested.  The patient was previously diagnosed 6 months prior to admission, but has poor follow-up.  No evidence of acute infiltrate identified on CXR.  Continue supportive care with IV Solumedrol and nebulizers ATC.  Start Symbicort for maintenance medication.  Continue Levaquin for possible sinus infection.  Smoking Cessation: add Nicotine Patch 14mg and encourage smoking cessation  GI/DVT prophylaxis

## 2018-05-21 NOTE — ED PROVIDER NOTE - NS ED ROS FT
REVIEW OF SYSTEMS:    CONSTITUTIONAL: No weakness, fevers or chills  EYES/ENT: No visual changes;  No vertigo or throat pain   NECK: No pain or stiffness  RESPIRATORY: shotness of breath and cough  CARDIOVASCULAR: No chest pain or palpitations  GASTROINTESTINAL: No abdominal or epigastric pain. No nausea, vomiting, or hematemesis; No diarrhea or constipation. No melena or hematochezia.  GENITOURINARY: No dysuria, frequency or hematuria  NEUROLOGICAL: No numbness or weakness  SKIN: No itching, rashes

## 2018-05-21 NOTE — H&P ADULT - ASSESSMENT
47 yo M PMHx COPD pw SOB/wheezing/cough with brown/red sputum production admitted for copd exacerbation.      1. COPD exacerbation  - iv steroids, duonebs, levaquin  - check LE Duplex   - pancx

## 2018-05-21 NOTE — ED PROVIDER NOTE - PROGRESS NOTE DETAILS
I made 3 calls to Dr Medellin cell phone [phone] and left 2 voicemail w no call back. will admit to hospitalist

## 2018-05-21 NOTE — H&P ADULT - HISTORY OF PRESENT ILLNESS
48 year old M PMHx COPD, current smoker p/w worsening shortness of breath on minimal exertion a/w wheezing, productive sputum x4 days. Was seen in the ED yesterday - for similar symptoms, was given nebulizer rx and sent home.  Despite using nebulizer machine, states SOB not improved.  Today feels subjective fevers, increased sputum production, pleuritic chest pain with cough.  Only on nebulizer treatments, does not take inhaled LABA or inhaled/oral steroids.      Denies LE edema or LE calf pain, prolonged immobility, recent travel, sick contacts.    Patient still current smoker - approx 30 pack year history.     Saw a pulmonologist at [facility].  Was given clinical diagnosis of COPD, but did not have PFTs.  Patient did not have subsequent follow up.

## 2018-05-22 RX ORDER — ZOLPIDEM TARTRATE 10 MG/1
5 TABLET ORAL ONCE
Qty: 0 | Refills: 0 | Status: DISCONTINUED | OUTPATIENT
Start: 2018-05-22 | End: 2018-05-23

## 2018-05-22 RX ORDER — NICOTINE POLACRILEX 2 MG
1 GUM BUCCAL DAILY
Qty: 0 | Refills: 0 | Status: DISCONTINUED | OUTPATIENT
Start: 2018-05-22 | End: 2018-05-23

## 2018-05-22 RX ORDER — PANTOPRAZOLE SODIUM 20 MG/1
40 TABLET, DELAYED RELEASE ORAL DAILY
Qty: 0 | Refills: 0 | Status: DISCONTINUED | OUTPATIENT
Start: 2018-05-22 | End: 2018-05-23

## 2018-05-22 RX ADMIN — Medication 60 MILLIGRAM(S): at 22:30

## 2018-05-22 RX ADMIN — PANTOPRAZOLE SODIUM 40 MILLIGRAM(S): 20 TABLET, DELAYED RELEASE ORAL at 11:30

## 2018-05-22 RX ADMIN — Medication 60 MILLIGRAM(S): at 13:41

## 2018-05-22 RX ADMIN — Medication 60 MILLIGRAM(S): at 06:01

## 2018-05-22 RX ADMIN — Medication 3 MILLILITER(S): at 09:39

## 2018-05-22 RX ADMIN — Medication 3 MILLILITER(S): at 20:19

## 2018-05-22 RX ADMIN — Medication 1 PATCH: at 11:30

## 2018-05-22 NOTE — PROGRESS NOTE ADULT - SUBJECTIVE AND OBJECTIVE BOX
Stacy Duong; Mona Busby; Sharon Douglas; User ADM; Ordering Physician; Sandra Cruz; Kathy Aguila; Kathy Aguila; Maral Loza; Leonel Doll; Leonel Doll; Sheba Kwok; Diaz Chavira; Adams Chirinos  Patient is a 48y old  Male who presents with a chief complaint of SOB (21 May 2018 19:58)      Vital Signs Last 24 Hrs  T(C): 36.1 (22 May 2018 05:14), Max: 36.9 (21 May 2018 13:37)  T(F): 96.9 (22 May 2018 05:14), Max: 98.4 (21 May 2018 13:37)  HR: 102 (22 May 2018 05:14) (95 - 113)  BP: 112/55 (22 May 2018 05:14) (112/55 - 130/65)  BP(mean): --  RR: 19 (22 May 2018 05:14) (19 - 22)  SpO2: 96% (21 May 2018 19:30) (96% - 98%)  CAPILLARY BLOOD GLUCOSE          PAST MEDICAL & SURGICAL HISTORY:  Anxiety  COPD (chronic obstructive pulmonary disease)  History of knee replacement    MEDICATIONS  (STANDING):  ALBUTerol/ipratropium for Nebulization 3 milliLiter(s) Nebulizer every 6 hours  heparin  Injectable 5000 Unit(s) SubCutaneous every 8 hours  levoFLOXacin  Tablet 750 milliGRAM(s) Oral every 24 hours  methylPREDNISolone sodium succinate Injectable 60 milliGRAM(s) IV Push every 8 hours    MEDICATIONS  (PRN):  hydrOXYzine hydrochloride 25 milliGRAM(s) Oral at bedtime PRN Anxiety      Physical Exam:  General: No distress  HEENT: Neck supple  Heart: RRR, S1, S2 noted  Lungs: Expiratory wheezing scattered through lung fields  Abdomen: soft, non tender, mildly distended, + bowel sounds  Extremities: no C/C/E, full ROM in all extremities  Neuro: A&O x 3, no focal deficits  Skin: No rashes    Labs:                        14.6   9.78  )-----------( 181      ( 21 May 2018 16:36 )             44.8             05-21    143  |  103  |  19  ----------------------------<  104<H>  4.0   |  27  |  0.9    Ca    8.4<L>      21 May 2018 16:36              PT/INR - ( 21 May 2018 16:36 )   PT: 11.10 sec;   INR: 1.03 ratio         PTT - ( 21 May 2018 16:36 )  PTT:20.7 sec  CARDIAC MARKERS ( 21 May 2018 16:36 )  x     / <0.01 ng/mL / x     / x     / x                I&O's Summary      Imaging:  < from: Xray Chest 1 View AP/PA (05.21.18 @ 16:21) >  No radiographic evidence of acute cardiopulmonary disease.    < end of copied text >    ECG:

## 2018-05-22 NOTE — PROGRESS NOTE ADULT - ASSESSMENT
47 yo M PMHx COPD pw SOB/wheezing/cough with brown/red sputum production admitted for copd exacerbation.      #COPD exacerbation  c/w solumedrol, nebs around the clock, levaquin  f/u Duplex of LE  f/u sputum culture and blood cultures  counseled on smoking cessation    DVT/GI ppx: patient refusing heparin subq, protonix  from home

## 2018-05-23 ENCOUNTER — TRANSCRIPTION ENCOUNTER (OUTPATIENT)
Age: 48
End: 2018-05-23

## 2018-05-23 VITALS
RESPIRATION RATE: 18 BRPM | TEMPERATURE: 97 F | HEART RATE: 96 BPM | SYSTOLIC BLOOD PRESSURE: 122 MMHG | DIASTOLIC BLOOD PRESSURE: 69 MMHG

## 2018-05-23 LAB
ANION GAP SERPL CALC-SCNC: 10 MMOL/L — SIGNIFICANT CHANGE UP (ref 7–14)
BASOPHILS # BLD AUTO: 0.02 K/UL — SIGNIFICANT CHANGE UP (ref 0–0.2)
BASOPHILS NFR BLD AUTO: 0.2 % — SIGNIFICANT CHANGE UP (ref 0–1)
BUN SERPL-MCNC: 21 MG/DL — HIGH (ref 10–20)
CALCIUM SERPL-MCNC: 9 MG/DL — SIGNIFICANT CHANGE UP (ref 8.5–10.1)
CHLORIDE SERPL-SCNC: 99 MMOL/L — SIGNIFICANT CHANGE UP (ref 98–110)
CO2 SERPL-SCNC: 34 MMOL/L — HIGH (ref 17–32)
CREAT SERPL-MCNC: 0.8 MG/DL — SIGNIFICANT CHANGE UP (ref 0.7–1.5)
EOSINOPHIL # BLD AUTO: 0 K/UL — SIGNIFICANT CHANGE UP (ref 0–0.7)
EOSINOPHIL NFR BLD AUTO: 0 % — SIGNIFICANT CHANGE UP (ref 0–8)
GLUCOSE SERPL-MCNC: 183 MG/DL — HIGH (ref 70–99)
HCT VFR BLD CALC: 48.8 % — SIGNIFICANT CHANGE UP (ref 42–52)
HGB BLD-MCNC: 15.7 G/DL — SIGNIFICANT CHANGE UP (ref 14–18)
IMM GRANULOCYTES NFR BLD AUTO: 0.5 % — HIGH (ref 0.1–0.3)
LYMPHOCYTES # BLD AUTO: 0.98 K/UL — LOW (ref 1.2–3.4)
LYMPHOCYTES # BLD AUTO: 7.4 % — LOW (ref 20.5–51.1)
MCHC RBC-ENTMCNC: 29.4 PG — SIGNIFICANT CHANGE UP (ref 27–31)
MCHC RBC-ENTMCNC: 32.2 G/DL — SIGNIFICANT CHANGE UP (ref 32–37)
MCV RBC AUTO: 91.4 FL — SIGNIFICANT CHANGE UP (ref 80–94)
MONOCYTES # BLD AUTO: 0.39 K/UL — SIGNIFICANT CHANGE UP (ref 0.1–0.6)
MONOCYTES NFR BLD AUTO: 2.9 % — SIGNIFICANT CHANGE UP (ref 1.7–9.3)
NEUTROPHILS # BLD AUTO: 11.8 K/UL — HIGH (ref 1.4–6.5)
NEUTROPHILS NFR BLD AUTO: 89 % — HIGH (ref 42.2–75.2)
NRBC # BLD: 0 /100 WBCS — SIGNIFICANT CHANGE UP (ref 0–0)
PLATELET # BLD AUTO: 230 K/UL — SIGNIFICANT CHANGE UP (ref 130–400)
POTASSIUM SERPL-MCNC: 4.8 MMOL/L — SIGNIFICANT CHANGE UP (ref 3.5–5)
POTASSIUM SERPL-SCNC: 4.8 MMOL/L — SIGNIFICANT CHANGE UP (ref 3.5–5)
RBC # BLD: 5.34 M/UL — SIGNIFICANT CHANGE UP (ref 4.7–6.1)
RBC # FLD: 13.9 % — SIGNIFICANT CHANGE UP (ref 11.5–14.5)
SODIUM SERPL-SCNC: 143 MMOL/L — SIGNIFICANT CHANGE UP (ref 135–146)
WBC # BLD: 13.26 K/UL — HIGH (ref 4.8–10.8)
WBC # FLD AUTO: 13.26 K/UL — HIGH (ref 4.8–10.8)

## 2018-05-23 RX ORDER — LEVOFLOXACIN 5 MG/ML
1 INJECTION, SOLUTION INTRAVENOUS
Qty: 5 | Refills: 0
Start: 2018-05-23 | End: 2018-05-27

## 2018-05-23 RX ORDER — TIOTROPIUM BROMIDE 18 UG/1
2 CAPSULE ORAL; RESPIRATORY (INHALATION)
Qty: 1 | Refills: 0
Start: 2018-05-23 | End: 2018-06-21

## 2018-05-23 RX ORDER — NICOTINE POLACRILEX 2 MG
1 GUM BUCCAL
Qty: 1 | Refills: 0
Start: 2018-05-23 | End: 2018-06-21

## 2018-05-23 RX ADMIN — Medication 60 MILLIGRAM(S): at 05:45

## 2018-05-23 RX ADMIN — ZOLPIDEM TARTRATE 5 MILLIGRAM(S): 10 TABLET ORAL at 00:19

## 2018-05-23 RX ADMIN — Medication 1 PATCH: at 11:10

## 2018-05-23 RX ADMIN — Medication 3 MILLILITER(S): at 09:33

## 2018-05-23 RX ADMIN — Medication 1 PATCH: at 11:11

## 2018-05-23 NOTE — DISCHARGE NOTE ADULT - MEDICATION SUMMARY - MEDICATIONS TO TAKE
I will START or STAY ON the medications listed below when I get home from the hospital:    predniSONE 20 mg oral tablet  -- 3 tab(s) by mouth once a day for 2 days, then take 2 tabs once a day for 3 days, then take 1 tab once a day for 3 days, then stop.  -- It is very important that you take or use this exactly as directed.  Do not skip doses or discontinue unless directed by your doctor.  Obtain medical advice before taking any non-prescription drugs as some may affect the action of this medication.  Take with food or milk.    -- Indication: For COPD    hydrOXYzine pamoate 25 mg oral capsule  -- orally once a day (at bedtime), As Needed  -- Indication: For Hydroxyzine    DuoNeb 0.5 mg-2.5 mg/3 mL inhalation solution  -- 3 milliliter(s) inhaled 4 times a day, As Needed  -- Indication: For nebulizer    Spiriva Respimat 1.25 mcg/inh inhalation aerosol  -- 2 puff(s) inhaled once a day   -- Check with your doctor before becoming pregnant.  For inhalation only.    -- Indication: For COPD inhaler    levoFLOXacin 750 mg oral tablet  -- 1 tab(s) by mouth every 24 hours  -- Indication: For Antibiotics    nicotine 14 mg/24 hr transdermal film, extended release  -- 1 patch by transdermal patch 2 times a week   -- Indication: For smoking

## 2018-05-23 NOTE — DISCHARGE NOTE ADULT - CARE PLAN
Principal Discharge DX:	COPD (chronic obstructive pulmonary disease)  Goal:	prevent complications of disease  Assessment and plan of treatment:	Please continue & finish the course of antibiotics - levaquin for 5 more days. Continue using prednisone taper --> take 3 tabs (60mg) daily for 2 days, then take 2 tabs (40mg) for 3 days, then take 1 tab (20mg) for 3 days, then stop. You are prescribed an inhaler to use as well. Please use that as prescribed. Follow up with Dr. Kumar in her office in 1-2 weeks. Also you should make an appointment to see a pulmonologist. I recommend Dr. Reese.

## 2018-05-23 NOTE — DISCHARGE NOTE ADULT - PLAN OF CARE
prevent complications of disease Please continue & finish the course of antibiotics - levaquin for 5 more days. Continue using prednisone taper --> take 3 tabs (60mg) daily for 2 days, then take 2 tabs (40mg) for 3 days, then take 1 tab (20mg) for 3 days, then stop. You are prescribed an inhaler to use as well. Please use that as prescribed. Follow up with Dr. Kumar in her office in 1-2 weeks. Also you should make an appointment to see a pulmonologist. I recommend Dr. Reese.

## 2018-05-23 NOTE — DISCHARGE NOTE ADULT - HOSPITAL COURSE
47 yo M PMHx COPD pw SOB/wheezing/cough with brown/red sputum production admitted for copd exacerbation.  Patient improved on solumedrol, antibiotics, and nebulizers. He will be d/c'd home on spiriva, prednisone taper, continuation/finish his antibiotic course and to follow up with Dr. Kumar in her office in 1-2 weeks.

## 2018-05-23 NOTE — DISCHARGE NOTE ADULT - PATIENT PORTAL LINK FT
You can access the [facility], offered by [facility], by registering with the following website: [email or web address]

## 2018-05-23 NOTE — DISCHARGE NOTE ADULT - CARE PROVIDER_API CALL
Ghulam Patino), Critical Care Medicine; Internal Medicine; Pulmonary Disease; Sleep Medicine  [address] [place] [address]  Phone: [phone]  Fax: [phone]

## 2018-05-29 ENCOUNTER — OUTPATIENT (OUTPATIENT)
Dept: OUTPATIENT SERVICES | Facility: HOSPITAL | Age: 48
LOS: 1 days | Discharge: HOME | End: 2018-05-29

## 2018-05-29 DIAGNOSIS — Z96.659 PRESENCE OF UNSPECIFIED ARTIFICIAL KNEE JOINT: Chronic | ICD-10-CM

## 2018-08-28 ENCOUNTER — OUTPATIENT (OUTPATIENT)
Dept: OUTPATIENT SERVICES | Facility: HOSPITAL | Age: 48
LOS: 1 days | Discharge: HOME | End: 2018-08-28

## 2018-08-28 DIAGNOSIS — Z96.659 PRESENCE OF UNSPECIFIED ARTIFICIAL KNEE JOINT: Chronic | ICD-10-CM

## 2018-08-28 PROBLEM — J44.9 CHRONIC OBSTRUCTIVE PULMONARY DISEASE, UNSPECIFIED: Chronic | Status: ACTIVE | Noted: 2018-05-20

## 2018-08-28 PROBLEM — F41.9 ANXIETY DISORDER, UNSPECIFIED: Chronic | Status: ACTIVE | Noted: 2018-05-21

## 2019-02-01 ENCOUNTER — OUTPATIENT (OUTPATIENT)
Dept: OUTPATIENT SERVICES | Facility: HOSPITAL | Age: 49
LOS: 1 days | End: 2019-02-01
Payer: MEDICAID

## 2019-02-01 DIAGNOSIS — Z96.659 PRESENCE OF UNSPECIFIED ARTIFICIAL KNEE JOINT: Chronic | ICD-10-CM

## 2019-02-01 PROCEDURE — G9001: CPT

## 2019-02-02 ENCOUNTER — EMERGENCY (EMERGENCY)
Facility: HOSPITAL | Age: 49
LOS: 0 days | Discharge: HOME | End: 2019-02-02
Admitting: PHYSICIAN ASSISTANT

## 2019-02-02 VITALS — HEIGHT: 72 IN | WEIGHT: 315 LBS

## 2019-02-02 VITALS
DIASTOLIC BLOOD PRESSURE: 69 MMHG | RESPIRATION RATE: 20 BRPM | SYSTOLIC BLOOD PRESSURE: 139 MMHG | HEART RATE: 95 BPM | TEMPERATURE: 98 F | OXYGEN SATURATION: 95 %
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
## 2019-02-02 DIAGNOSIS — J44.9 CHRONIC OBSTRUCTIVE PULMONARY DISEASE, UNSPECIFIED: ICD-10-CM
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
## 2019-02-02 DIAGNOSIS — Z96.659 PRESENCE OF UNSPECIFIED ARTIFICIAL KNEE JOINT: Chronic | ICD-10-CM
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
## 2019-02-02 DIAGNOSIS — S83.8X1A SPRAIN OF OTHER SPECIFIED PARTS OF RIGHT KNEE, INITIAL ENCOUNTER: ICD-10-CM

## 2019-02-02 DIAGNOSIS — M25.561 PAIN IN RIGHT KNEE: ICD-10-CM

## 2019-02-02 NOTE — ED PROVIDER NOTE - OBJECTIVE STATEMENT
Pt with right knee pain s/p fall 3 feet from ladder onto heel and felt pain in the knee. Now with pain on wt bearing. Was able to walk on it initially. Pt has hx of patellar fx that required surgery as well as meniscal surgery in same knee and has limited ROM as baseline

## 2019-02-02 NOTE — ED PROVIDER NOTE - PHYSICAL EXAMINATION
CONST: Well appearing in NAD  MS: Normal ROM in all extremities except right knee which has pain and limited flexion. there is medial tenderness, No effusion. NV intact distallyNo midline spinal tenderness.  SKIN: Warm, dry, no acute rashes. Good turgor  NEURO: A&Ox3, No focal deficits. Strength 5/5 with no sensory deficits.

## 2019-02-02 NOTE — ED ADULT NURSE NOTE - NSIMPLEMENTINTERV_GEN_ALL_ED
Implemented All Universal Safety Interventions:  [place] to call system. Call bell, personal items and telephone within reach. Instruct patient to call for assistance. Room bathroom lighting operational. Non-slip footwear when patient is off stretcher. Physically safe environment: no spills, clutter or unnecessary equipment. Stretcher in lowest position, wheels locked, appropriate side rails in place.

## 2019-02-25 DIAGNOSIS — Z71.89 OTHER SPECIFIED COUNSELING: ICD-10-CM

## 2019-02-26 ENCOUNTER — OUTPATIENT (OUTPATIENT)
Dept: OUTPATIENT SERVICES | Facility: HOSPITAL | Age: 49
LOS: 1 days | Discharge: HOME | End: 2019-02-26

## 2019-02-26 DIAGNOSIS — Z96.659 PRESENCE OF UNSPECIFIED ARTIFICIAL KNEE JOINT: Chronic | ICD-10-CM

## 2019-02-28 DIAGNOSIS — H26.9 UNSPECIFIED CATARACT: ICD-10-CM

## 2019-02-28 DIAGNOSIS — H18.223 IDIOPATHIC CORNEAL EDEMA, BILATERAL: ICD-10-CM

## 2019-02-28 DIAGNOSIS — T15.02XS FOREIGN BODY IN CORNEA, LEFT EYE, SEQUELA: ICD-10-CM

## 2019-05-14 ENCOUNTER — OUTPATIENT (OUTPATIENT)
Dept: OUTPATIENT SERVICES | Facility: HOSPITAL | Age: 49
LOS: 1 days | Discharge: HOME | End: 2019-05-14

## 2019-05-14 DIAGNOSIS — Z96.659 PRESENCE OF UNSPECIFIED ARTIFICIAL KNEE JOINT: Chronic | ICD-10-CM

## 2019-05-16 DIAGNOSIS — K05.6 PERIODONTAL DISEASE, UNSPECIFIED: ICD-10-CM

## 2019-06-23 NOTE — ED PROCEDURE NOTE - CPROC ED ANATOMIC LOCATION1
Medication Reconciliation has been completed by interviewing patient with consent to do so with family/visitors in the room.    Allergies were reviewed    No oral antibiotics within the past 14 days    Pt home pharmacy:[facility]     right/knee

## 2019-08-19 ENCOUNTER — APPOINTMENT (OUTPATIENT)
Dept: NEUROLOGY | Facility: CLINIC | Age: 49
End: 2019-08-19

## 2019-08-27 ENCOUNTER — OUTPATIENT (OUTPATIENT)
Dept: OUTPATIENT SERVICES | Facility: HOSPITAL | Age: 49
LOS: 1 days | Discharge: HOME | End: 2019-08-27
Payer: MEDICAID

## 2019-08-27 DIAGNOSIS — Z96.659 PRESENCE OF UNSPECIFIED ARTIFICIAL KNEE JOINT: Chronic | ICD-10-CM

## 2019-08-27 PROCEDURE — 92012 INTRM OPH EXAM EST PATIENT: CPT

## 2021-01-02 NOTE — PATIENT PROFILE ADULT. - AS SC BRADEN FRICTION
PT STARTED IMMUNE GLOBULIN AT APPROX 0620; BASELINE VITALS IN FLOW SHEETS. VSS, AFEBRILE, AOX4, DENIES LIGHTHEADED/SHORT OF BREATH/DENIES ITCHY/DENIES DALILA. RN CONTINUING TO MONITOR AND INCREASE IMMUNE GLOBULIN RATE PER PHARMACY DIRECTIONS; SEE MAR. (3) no apparent problem

## 2021-08-05 ENCOUNTER — EMERGENCY (EMERGENCY)
Facility: HOSPITAL | Age: 51
LOS: 0 days | Discharge: HOME | End: 2021-08-05
Attending: EMERGENCY MEDICINE | Admitting: EMERGENCY MEDICINE
Payer: MEDICAID

## 2021-08-05 VITALS
RESPIRATION RATE: 20 BRPM | WEIGHT: 315 LBS | TEMPERATURE: 98 F | SYSTOLIC BLOOD PRESSURE: 137 MMHG | HEIGHT: 72 IN | OXYGEN SATURATION: 99 % | HEART RATE: 98 BPM | DIASTOLIC BLOOD PRESSURE: 63 MMHG

## 2021-08-05 DIAGNOSIS — Z86.59 PERSONAL HISTORY OF OTHER MENTAL AND BEHAVIORAL DISORDERS: ICD-10-CM

## 2021-08-05 DIAGNOSIS — Z88.0 ALLERGY STATUS TO PENICILLIN: ICD-10-CM

## 2021-08-05 DIAGNOSIS — I82.401 ACUTE EMBOLISM AND THROMBOSIS OF UNSPECIFIED DEEP VEINS OF RIGHT LOWER EXTREMITY: ICD-10-CM

## 2021-08-05 DIAGNOSIS — R26.2 DIFFICULTY IN WALKING, NOT ELSEWHERE CLASSIFIED: ICD-10-CM

## 2021-08-05 DIAGNOSIS — Z96.659 PRESENCE OF UNSPECIFIED ARTIFICIAL KNEE JOINT: Chronic | ICD-10-CM

## 2021-08-05 DIAGNOSIS — Z79.899 OTHER LONG TERM (CURRENT) DRUG THERAPY: ICD-10-CM

## 2021-08-05 DIAGNOSIS — F17.200 NICOTINE DEPENDENCE, UNSPECIFIED, UNCOMPLICATED: ICD-10-CM

## 2021-08-05 DIAGNOSIS — E78.5 HYPERLIPIDEMIA, UNSPECIFIED: ICD-10-CM

## 2021-08-05 DIAGNOSIS — M79.604 PAIN IN RIGHT LEG: ICD-10-CM

## 2021-08-05 DIAGNOSIS — J44.9 CHRONIC OBSTRUCTIVE PULMONARY DISEASE, UNSPECIFIED: ICD-10-CM

## 2021-08-05 PROCEDURE — 99284 EMERGENCY DEPT VISIT MOD MDM: CPT

## 2021-08-05 PROCEDURE — 93970 EXTREMITY STUDY: CPT | Mod: 26

## 2021-08-05 PROCEDURE — 71046 X-RAY EXAM CHEST 2 VIEWS: CPT | Mod: 26

## 2021-08-05 RX ORDER — ASPIRIN/CALCIUM CARB/MAGNESIUM 324 MG
1 TABLET ORAL
Qty: 7 | Refills: 0
Start: 2021-08-05 | End: 2021-08-11

## 2021-08-05 NOTE — ED PROVIDER NOTE - CLINICAL SUMMARY MEDICAL DECISION MAKING FREE TEXT BOX
50 y/o M PMHx COPD, HLD presenting for RLE pain x1 day. Seen by PMD last night and referred to ED for DVT study. Pain localized to R calf. Acute onset, atraumatic. No falls. No obvious fracture/deformity. Severe. No radiation. Worsened by walking and palpation. Has not taken anything for pain.  No chest pain/sob. Active smoker. No recent travel/surgeries/immobilization.    On exam, VS reviewed. Patient n/v intact. + Right calf tenderness with erythema around calf. Patient has superficial thrombophlebitis. DVT study performed and patient found to have a greater saphenous vein clot. This was discussed with patient and plan made to start high dose Aspirin and patient to immediately follow up with Vascular. He is aware he may need to start NOAC. Signs/Symptoms of PE discussed.    Patient is a good candidate to attempt outpatient management. Supportive care and home care discussed in detail. Patient aware they may have to return for re-evaluation and possible admission if outpatient treatment fails. Strict return precautions discussed.    Full DC instructions discussed and patient knows when to seek immediate medical attention.  Patient has proper follow up.  All results discussed and patient aware they may require further work up.  Proper follow up ensured. Limitations of ED work up discussed.  Medications administered and prescribed/OTC home meds discussed.  All questions and concerns from patient or family addressed. Understanding of instructions verbalized.

## 2021-08-05 NOTE — ED PROVIDER NOTE - PATIENT PORTAL LINK FT
You can access the FollowMyHealth Patient Portal offered by [facility] by registering at the following website: [email or web address]. By joining [employer]’s FollowMyHealth portal, you will also be able to view your health information using other applications (apps) compatible with our system.

## 2021-08-05 NOTE — ED PROVIDER NOTE - ATTENDING CONTRIBUTION TO CARE
I personally evaluated patient. I agree with the findings and plan with all documentation on chart except as documented  in my note.    50 y/o M PMHx COPD, HLD presenting for RLE pain x1 day. Seen by PMD last night and referred to ED for DVT study. Pain localized to R calf. Acute onset, atraumatic. No falls. No obvious fracture/deformity. Severe. No radiation. Worsened by walking and palpation. Has not taken anything for pain.  No chest pain/sob. Active smoker. No recent travel/surgeries/immobilization.    On exam, VS reviewed. Patient n/v intact. + Right calf tenderness with erythema around calf. Patient has superficial thrombophlebitis. DVT study performed and patient found to have a greater saphenous vein clot. This was discussed with patient and plan made to start high dose Aspirin and patient to immediately follow up with Vascular. He is aware he may need to start NOAC. Signs/Symptoms of PE discussed.    Patient is a good candidate to attempt outpatient management. Supportive care and home care discussed in detail. Patient aware they may have to return for re-evaluation and possible admission if outpatient treatment fails. Strict return precautions discussed.    Full DC instructions discussed and patient knows when to seek immediate medical attention.  Patient has proper follow up.  All results discussed and patient aware they may require further work up.  Proper follow up ensured. Limitations of ED work up discussed.  Medications administered and prescribed/OTC home meds discussed.  All questions and concerns from patient or family addressed. Understanding of instructions verbalized.

## 2021-08-05 NOTE — ED PROVIDER NOTE - PHYSICAL EXAMINATION
CONSTITUTIONAL: Well-developed; well-nourished; in no acute distress.   SKIN: warm, dry, +erythma to R posterio-medial calf   HEAD: Normocephalic; atraumatic.  EYES: no conjunctival injection. PERRL.   ENT: No nasal discharge; airway clear.  NECK: Supple; non tender.  CARD: S1, S2 normal; no murmurs, gallops, or rubs. Regular rate and rhythm.   RESP: No wheezes, rales or rhonchi.  ABD: soft ntnd  EXT: Normal ROM.  +tenerness to palpation of R posterio-medal calf  LYMPH: No acute cervical adenopathy.  NEURO: Alert, oriented, grossly unremarkable  PSYCH: Cooperative, appropriate.

## 2021-08-05 NOTE — ED PROVIDER NOTE - CARE PROVIDER_API CALL
Karsten Stoll  Vascular Surgery  [address] [place] [id number]  Phone: [phone]  Fax: [phone]  Follow Up Time: Urgent   no

## 2021-08-05 NOTE — ED PROVIDER NOTE - OBJECTIVE STATEMENT
51y M pmh COPD, HLD presenting for RLE pain x1 day. Seen by PMD last night and referred to ED for DVT study. Pain localized to R calf. Acute onset, atraumatic. No falls. No obvious fracture/deformity. Severe. No radiation. Worsened by walking and palpation. Has not taken anything for pain.  No chest pain/sob. Active smoker. No recent travel/surgeries/immobilization.

## 2021-08-05 NOTE — ED PROVIDER NOTE - NSFOLLOWUPINSTRUCTIONS_ED_ALL_ED_FT
Goal: Remain thrombus/emboli free and Prevent  future thrombotic/embolic events.  - If you experience weakness/inability to move extremities, loss of vison, swelling/warmth/redness/loss of sensation of your lower extremities, chest pain, shortness of breath, or facial droop, call 9-1-1 or go to the ED immediately.  - Take your medication as prescribed  - Follow up with your vascular surgeon

## 2022-10-13 ENCOUNTER — EMERGENCY (EMERGENCY)
Facility: HOSPITAL | Age: 52
LOS: 0 days | Discharge: HOME | End: 2022-10-13
Attending: STUDENT IN AN ORGANIZED HEALTH CARE EDUCATION/TRAINING PROGRAM | Admitting: INTERNAL MEDICINE

## 2022-10-13 VITALS
WEIGHT: 315 LBS | RESPIRATION RATE: 18 BRPM | HEIGHT: 72 IN | OXYGEN SATURATION: 98 % | TEMPERATURE: 99 F | DIASTOLIC BLOOD PRESSURE: 81 MMHG | SYSTOLIC BLOOD PRESSURE: 143 MMHG | HEART RATE: 92 BPM

## 2022-10-13 DIAGNOSIS — Z88.0 ALLERGY STATUS TO PENICILLIN: ICD-10-CM
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
## 2022-10-13 DIAGNOSIS — M79.89 OTHER SPECIFIED SOFT TISSUE DISORDERS: ICD-10-CM

## 2022-10-13 DIAGNOSIS — M54.2 CERVICALGIA: ICD-10-CM

## 2022-10-13 DIAGNOSIS — J44.1 CHRONIC OBSTRUCTIVE PULMONARY DISEASE WITH (ACUTE) EXACERBATION: ICD-10-CM

## 2022-10-13 DIAGNOSIS — Z79.82 LONG TERM (CURRENT) USE OF ASPIRIN: ICD-10-CM

## 2022-10-13 DIAGNOSIS — Z20.822 CONTACT WITH AND (SUSPECTED) EXPOSURE TO COVID-19: ICD-10-CM

## 2022-10-13 DIAGNOSIS — Z86.718 PERSONAL HISTORY OF OTHER VENOUS THROMBOSIS AND EMBOLISM: ICD-10-CM

## 2022-10-13 DIAGNOSIS — Z96.659 PRESENCE OF UNSPECIFIED ARTIFICIAL KNEE JOINT: Chronic | ICD-10-CM

## 2022-10-13 DIAGNOSIS — F17.200 NICOTINE DEPENDENCE, UNSPECIFIED, UNCOMPLICATED: ICD-10-CM

## 2022-10-13 DIAGNOSIS — R51.9 HEADACHE, UNSPECIFIED: ICD-10-CM
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
## 2022-10-13 DIAGNOSIS — M54.6 PAIN IN THORACIC SPINE: ICD-10-CM

## 2022-10-13 DIAGNOSIS — I25.2 OLD MYOCARDIAL INFARCTION: ICD-10-CM

## 2022-10-13 LAB
ALBUMIN SERPL ELPH-MCNC: 3.7 G/DL — SIGNIFICANT CHANGE UP (ref 3.5–5.2)
ALP SERPL-CCNC: 99 U/L — SIGNIFICANT CHANGE UP (ref 30–115)
ALT FLD-CCNC: 31 U/L — SIGNIFICANT CHANGE UP (ref 0–41)
ANION GAP SERPL CALC-SCNC: 7 MMOL/L — SIGNIFICANT CHANGE UP (ref 7–14)
AST SERPL-CCNC: 27 U/L — SIGNIFICANT CHANGE UP (ref 0–41)
BASE EXCESS BLDV CALC-SCNC: 10.7 MMOL/L — HIGH (ref -2–3)
BASOPHILS # BLD AUTO: 0.04 K/UL — SIGNIFICANT CHANGE UP (ref 0–0.2)
BASOPHILS NFR BLD AUTO: 0.4 % — SIGNIFICANT CHANGE UP (ref 0–1)
BILIRUB SERPL-MCNC: 0.4 MG/DL — SIGNIFICANT CHANGE UP (ref 0.2–1.2)
BUN SERPL-MCNC: 17 MG/DL — SIGNIFICANT CHANGE UP (ref 10–20)
CA-I SERPL-SCNC: 1.13 MMOL/L — LOW (ref 1.15–1.33)
CALCIUM SERPL-MCNC: 8.9 MG/DL — SIGNIFICANT CHANGE UP (ref 8.4–10.5)
CHLORIDE SERPL-SCNC: 96 MMOL/L — LOW (ref 98–110)
CO2 SERPL-SCNC: 36 MMOL/L — HIGH (ref 17–32)
CREAT SERPL-MCNC: 0.9 MG/DL — SIGNIFICANT CHANGE UP (ref 0.7–1.5)
EGFR: 102 ML/MIN/1.73M2 — SIGNIFICANT CHANGE UP
EOSINOPHIL # BLD AUTO: 0.17 K/UL — SIGNIFICANT CHANGE UP (ref 0–0.7)
EOSINOPHIL NFR BLD AUTO: 1.7 % — SIGNIFICANT CHANGE UP (ref 0–8)
GAS PNL BLDV: 133 MMOL/L — LOW (ref 136–145)
GAS PNL BLDV: SIGNIFICANT CHANGE UP
GAS PNL BLDV: SIGNIFICANT CHANGE UP
GLUCOSE SERPL-MCNC: 149 MG/DL — HIGH (ref 70–99)
HCO3 BLDV-SCNC: 42 MMOL/L — HIGH (ref 22–29)
HCT VFR BLD CALC: 58.4 % — HIGH (ref 42–52)
HCT VFR BLDA CALC: 56 % — HIGH (ref 39–51)
HGB BLD CALC-MCNC: 18.6 G/DL — HIGH (ref 12.6–17.4)
HGB BLD-MCNC: 18.5 G/DL — HIGH (ref 14–18)
IMM GRANULOCYTES NFR BLD AUTO: 0.4 % — HIGH (ref 0.1–0.3)
LACTATE BLDV-MCNC: 1.5 MMOL/L — SIGNIFICANT CHANGE UP (ref 0.5–2)
LYMPHOCYTES # BLD AUTO: 1.39 K/UL — SIGNIFICANT CHANGE UP (ref 1.2–3.4)
LYMPHOCYTES # BLD AUTO: 14.1 % — LOW (ref 20.5–51.1)
MCHC RBC-ENTMCNC: 30.1 PG — SIGNIFICANT CHANGE UP (ref 27–31)
MCHC RBC-ENTMCNC: 31.7 G/DL — LOW (ref 32–37)
MCV RBC AUTO: 95 FL — HIGH (ref 80–94)
MONOCYTES # BLD AUTO: 0.6 K/UL — SIGNIFICANT CHANGE UP (ref 0.1–0.6)
MONOCYTES NFR BLD AUTO: 6.1 % — SIGNIFICANT CHANGE UP (ref 1.7–9.3)
NEUTROPHILS # BLD AUTO: 7.62 K/UL — HIGH (ref 1.4–6.5)
NEUTROPHILS NFR BLD AUTO: 77.3 % — HIGH (ref 42.2–75.2)
NRBC # BLD: 0 /100 WBCS — SIGNIFICANT CHANGE UP (ref 0–0)
NT-PROBNP SERPL-SCNC: 251 PG/ML — SIGNIFICANT CHANGE UP (ref 0–300)
PCO2 BLDV: 79 MMHG — HIGH (ref 42–55)
PH BLDV: 7.33 — SIGNIFICANT CHANGE UP (ref 7.32–7.43)
PLATELET # BLD AUTO: 103 K/UL — LOW (ref 130–400)
PO2 BLDV: 31 MMHG — SIGNIFICANT CHANGE UP
POTASSIUM BLDV-SCNC: 4.2 MMOL/L — SIGNIFICANT CHANGE UP (ref 3.5–5.1)
POTASSIUM SERPL-MCNC: 5.4 MMOL/L — HIGH (ref 3.5–5)
POTASSIUM SERPL-SCNC: 5.4 MMOL/L — HIGH (ref 3.5–5)
PROT SERPL-MCNC: 7 G/DL — SIGNIFICANT CHANGE UP (ref 6–8)
RBC # BLD: 6.15 M/UL — HIGH (ref 4.7–6.1)
RBC # FLD: 14.6 % — HIGH (ref 11.5–14.5)
SAO2 % BLDV: 62.2 % — SIGNIFICANT CHANGE UP
SARS-COV-2 RNA SPEC QL NAA+PROBE: SIGNIFICANT CHANGE UP
SODIUM SERPL-SCNC: 139 MMOL/L — SIGNIFICANT CHANGE UP (ref 135–146)
TROPONIN T SERPL-MCNC: <0.01 NG/ML — SIGNIFICANT CHANGE UP
WBC # BLD: 9.86 K/UL — SIGNIFICANT CHANGE UP (ref 4.8–10.8)
WBC # FLD AUTO: 9.86 K/UL — SIGNIFICANT CHANGE UP (ref 4.8–10.8)

## 2022-10-13 PROCEDURE — 93010 ELECTROCARDIOGRAM REPORT: CPT

## 2022-10-13 PROCEDURE — 93970 EXTREMITY STUDY: CPT | Mod: 26

## 2022-10-13 PROCEDURE — 99285 EMERGENCY DEPT VISIT HI MDM: CPT

## 2022-10-13 PROCEDURE — 71045 X-RAY EXAM CHEST 1 VIEW: CPT | Mod: 26

## 2022-10-13 RX ORDER — ACETAMINOPHEN 500 MG
975 TABLET ORAL ONCE
Refills: 0 | Status: COMPLETED | OUTPATIENT
Start: 2022-10-13 | End: 2022-10-13

## 2022-10-13 RX ORDER — METHOCARBAMOL 500 MG/1
2 TABLET, FILM COATED ORAL
Qty: 40 | Refills: 0
Start: 2022-10-13 | End: 2022-10-17

## 2022-10-13 RX ORDER — IPRATROPIUM/ALBUTEROL SULFATE 18-103MCG
3 AEROSOL WITH ADAPTER (GRAM) INHALATION
Refills: 0 | Status: COMPLETED | OUTPATIENT
Start: 2022-10-13 | End: 2022-10-13

## 2022-10-13 RX ORDER — ALBUTEROL 90 UG/1
1 AEROSOL, METERED ORAL ONCE
Refills: 0 | Status: COMPLETED | OUTPATIENT
Start: 2022-10-13 | End: 2022-10-13

## 2022-10-13 RX ORDER — METHOCARBAMOL 500 MG/1
1000 TABLET, FILM COATED ORAL ONCE
Refills: 0 | Status: COMPLETED | OUTPATIENT
Start: 2022-10-13 | End: 2022-10-13

## 2022-10-13 RX ADMIN — ALBUTEROL 1 PUFF(S): 90 AEROSOL, METERED ORAL at 12:57

## 2022-10-13 RX ADMIN — Medication 975 MILLIGRAM(S): at 12:56

## 2022-10-13 RX ADMIN — METHOCARBAMOL 1000 MILLIGRAM(S): 500 TABLET, FILM COATED ORAL at 12:57

## 2022-10-13 RX ADMIN — Medication 125 MILLIGRAM(S): at 12:57

## 2022-10-13 NOTE — ED PROVIDER NOTE - CLINICAL SUMMARY MEDICAL DECISION MAKING FREE TEXT BOX
52-year-old male presented with COPD exacerbation as well as evaluation for fall 1 week ago with left-sided neck pain.  Neck pain likely musculoskeletal, given muscle relaxer in the emergency department.  Patient reports improvement of symptoms in the emergency department after medications for COPD exacerbation treatment.  Patient will follow-up outpatient with pulmonology as well as orthopedics.  Patient able to ambulate in the emergency department without assistance, speaking full sentences, no signs of respiratory distress on discharge. Patient to be discharged from ED. Any available test results were discussed with patient and/or family. Verbal instructions given, including instructions to return to ED immediately for any new, worsening, or concerning symptoms. Patient endorsed understanding. Written discharge instructions additionally given, including follow-up plan.

## 2022-10-13 NOTE — ED PROVIDER NOTE - PROGRESS NOTE DETAILS
FF: pt refused to lay down to complete ct scans. pt repots after medications given his neck pain improved. I spoke with vascular tech who reports difficult to complete study because calf vessels were hard to see due to b/l extremity swelling but no visible dvt. I discussed lab and radiology results with pt. used share decision making with pt to decide plan to admit or dc with follow up. pt reports he does not want to be admitted he has a cardiologist. pt returns he usually used 2.5L of oxygen at home, has a nebulizer and cpap machine. pt advised of return precautions discussed at bedside. agreeable to dc. Pt is walking with steady gait.

## 2022-10-13 NOTE — ED ADULT NURSE NOTE - OBJECTIVE STATEMENT
pt presents with left ankle pain and swelling s/p fall off his truck x 1 week. pt states his hand slipped holding on the truck bar and fell. denies head trauma. pt wasn't seen last week post the fall. pt also mentioned that he feels SOB in the ED worsening with wearing the mask. pt with hx. COPD

## 2022-10-13 NOTE — ED PROVIDER NOTE - CARE PROVIDER_API CALL
Kennedy Love)  Cardiology; Interventional Cardiology  [address] [facility], [address]  [place] [address]  Phone: [phone]  Fax: [phone]  Follow Up Time: 7-10 Days    Ghulam Patino)  Critical Care Medicine; Internal Medicine; Pulmonary Disease; Sleep Medicine  [address] [place] [address]  Phone: [phone]  Fax: [phone]  Follow Up Time: Routine    Karsten Saunders)  Orthopaedic Surgery  [address] [place] [address]  Phone: [phone]  Fax: [phone]  Follow Up Time: 7-10 Days    Karsten Stoll)  Surgery; Vascular Surgery  [address] [place] [address]  Phone: [phone]  Fax: [phone]  Follow Up Time: 7-10 Days    YOUR CARDIOLOGIST,   [address] [place], [place]  Phone: (   )    -  Fax: (   )    -  Follow Up Time:     Stacy Duong)  Medicine  [address] [place] [address]  Phone: [phone]  Fax: [phone]  Follow Up Time: 7-10 Days

## 2022-10-13 NOTE — ED PROVIDER NOTE - CARE PLAN
1 Principal Discharge DX:	Neck pain  Secondary Diagnosis:	SOB (shortness of breath)  Secondary Diagnosis:	Leg swelling   Principal Discharge DX:	Neck pain  Assessment and plan of treatment:	plan - tx copd, cxr, labs, ekg, reassess  Secondary Diagnosis:	SOB (shortness of breath)  Secondary Diagnosis:	Leg swelling

## 2022-10-13 NOTE — ED PROVIDER NOTE - PROVIDER TOKENS
PROVIDER:[TOKEN:[[id number]],FOLLOWUP:[7-10 Days]],PROVIDER:[TOKEN:[[id number]],FOLLOWUP:[Routine]],PROVIDER:[TOKEN:[[id number]],FOLLOWUP:[7-10 Days]],PROVIDER:[TOKEN:[[id number]],FOLLOWUP:[7-10 Days]],FREE:[LAST:[YOUR CARDIOLOGIST],PHONE:[(   )    -],FAX:[(   )    -],ADDRESS:[[address] [place] [address]]],PROVIDER:[TOKEN:[[id number]],FOLLOWUP:[7-10 Days]]

## 2022-10-13 NOTE — ED PROVIDER NOTE - CARE PROVIDERS DIRECT ADDRESSES
[email or web address],DirectAddress_Unknown,[email or web address],DirectAddress_Unknown,DirectAddress_Unknown

## 2022-10-13 NOTE — ED PROVIDER NOTE - NS ED ATTENDING STATEMENT MOD
This was a shared visit with the DANIELLE. I reviewed and verified the documentation and independently performed the documented:

## 2022-10-13 NOTE — ED ADULT TRIAGE NOTE - CHIEF COMPLAINT QUOTE
"last week I fell out of my truck and smashed the back of my head on the street and I didn't get treated for it. now im having pain in the back of my head, my back"

## 2022-10-13 NOTE — ED PROVIDER NOTE - NS ED ROS FT
CONST: No fever, chills or bodyaches  EYES: No pain, redness, drainage or visual changes.  ENT: No ear pain or discharge, nasal discharge or congestion. No sore throat  CARD: No chest pain, palpitations  RESP: (+) SOB. No cough, hemoptysis. (+) hx of COPD  GI: No abdominal pain, N/V/D  : No urinary symptoms  MS: No joint pain, back pain or extremity pain/injury. (+) neck pain  SKIN: No rashes. (+) b/l leg swelling  NEURO: (+) headache. No dizziness, paresthesias or LOC

## 2022-10-13 NOTE — ED PROVIDER NOTE - PHYSICAL EXAMINATION
Physical Exam    Vital Signs: I have reviewed the initial vital signs.  Constitutional: appears stated age, no acute distress  Eyes: Conjunctiva pink, Sclera clear, PERRLA, EOMI without pain.   Cardiovascular: S1 and S2, regular rate, regular rhythm, well-perfused extremities, radial pulses equal and 2+ b/l.   Respiratory: unlabored respiratory effort, (+) wheezing throughout b/l.  pt is speaking full sentences. no accessory muscle use.   Gastrointestinal: soft, non-tender, nondistended abdomen, no pulsatile mass, normal bowl sounds, no rebound, no guarding  Musculoskeletal: supple neck, (+) b/l lower extremity edema left worse than right, no calf tenderness, no midline tenderness, no palpable spinal step offs. (+) tenderness to the left lateral neck and trapezius muscle.   Integumentary: warm, dry, no rash. no abrasions, lacerations, and ecchymosis.   Neurologic: awake, alert, cranial nerves II-XII grossly intact, extremities’ motor and sensory functions grossly intact. finger to nose intact. sensation over the deltoids intact and equal b/l. median, radial, and ulnar nerve motor and sensory functions grossly intact and equal b/l. negative pronator drift.   Psychiatric: appropriate mood, appropriate affect

## 2022-10-13 NOTE — ED PROVIDER NOTE - NSFOLLOWUPINSTRUCTIONS_ED_ALL_ED_FT
Acute Neck Pain    WHAT YOU NEED TO KNOW:    Acute neck pain starts suddenly, increases quickly, and goes away in a few days. The pain may come and go, or be worse with certain movements. The pain may be only in your neck, or it may move to your arms, back, or shoulders. You may also have pain that starts in another body area and moves to your neck. Vertebral Column         DISCHARGE INSTRUCTIONS:    Return to the emergency department if:     You have an injury that causes neck pain and shooting pain down your arms or legs.      Your neck pain suddenly becomes severe.      You have neck pain along with numbness, tingling, or weakness in your arms or legs.      You have a stiff neck, a headache, and a fever.    Contact your healthcare provider if:     You have new or worsening symptoms.      Your symptoms continue even after treatment.      You have questions or concerns about your condition or care.    Medicines:     NSAIDs, such as ibuprofen, help decrease swelling, pain, and fever. This medicine is available without a doctor's order. Ask your healthcare provider which medicine to take and how often to take it. Follow directions. NSAIDs can cause stomach bleeding or kidney problems if not taken correctly. If you take blood thinner medicine, always ask if NSAIDs are safe for you.      Acetaminophen helps decrease pain and fever. Ask your healthcare provider how much to take and how often to take it. Follow directions. Acetaminophen can cause liver damage if not taken correctly.      Steroid medicine may be used to reduce inflammation. This can help relieve pain caused by swelling.      Take your medicine as directed. Contact your healthcare provider if you think your medicine is not helping or if you have side effects. Tell him or her if you are allergic to any medicine. Keep a list of the medicines, vitamins, and herbs you take. Include the amounts, and when and why you take them. Bring the list or the pill bottles to follow-up visits. Carry your medicine list with you in case of an emergency.    Manage or prevent acute neck pain:     Rest your neck as directed. Do not make sudden movements, such as turning your head quickly. Your healthcare provider may recommend you wear a cervical collar for a short time. The collar will prevent you from moving your head. This will give your neck time to heal if an injury is causing your neck pain. Ask your healthcare provider when you can return to sports or other normal daily activities.      Apply heat as directed. Heat helps relieve pain and swelling. Use a heat wrap, or soak a small towel in warm water. Wring out the extra water. Apply the heat wrap or towel for 20 minutes every hour, or as directed.      Apply ice as directed. Ice helps relieve pain and swelling, and can help prevent tissue damage. Use an ice pack, or put ice in a bag. Cover the ice pack or back with a towel before you apply it to your neck. Apply the ice pack or ice for 15 minutes every hour, or as directed. Your healthcare provider can tell you how often to apply ice.      Do neck exercises as directed. Neck exercises help strengthen the muscles and increase range of motion. Your healthcare provider will tell you which exercises are right for you. He may give you instructions, or he may recommend that you work with a physical therapist. Your healthcare provider or therapist can make sure you are doing the exercises correctly.       Maintain good posture. Try to keep your head and shoulders lifted when you sit. If you work in front of a computer, make sure the monitor is at the right level. You should not need to look up down to see the screen. You should also not have to lean forward to be able to read what is on the screen. Make sure your keyboard, mouse, and other computer items are placed where you do not have to extend your shoulder to reach them. Get up often if you work in front of a computer or sit for long periods of time. Stretch or walk around to keep your neck muscles loose.    Follow up with your healthcare provider as directed: Your healthcare provider may refer you to a specialist if your pain does not get better with treatment. Write down your questions so you remember to ask them during your visits.       © Copyright "[employer] 2019 All illustrations and images included in CareNotes are the copyrighted property of [employer] or [employer].        WHAT YOU NEED TO KNOW:    Leg edema is swelling caused by fluid buildup. Your legs may swell if you sit or stand for long periods of time, are pregnant, or are injured. Swelling may also occur if you have heart failure or circulation problems. This means that your heart does not pump blood through your body as it should.    DISCHARGE INSTRUCTIONS:    Self-care:     Elevate your legs: Raise your legs above the level of your heart as often as you can. This will help decrease swelling and pain. Prop your legs on pillows or blankets to keep them elevated comfortably.      Wear pressure stockings: These tight stockings put pressure on your legs to promote blood flow and prevent blood clots. Wear the stockings during the day. Do not wear them while you sleep.      Apply heat: Heat helps decrease pain and swelling. Apply heat on the area for 20 to 30 minutes every 2 hours for as many days as directed.       Stay active: Do not stand or sit for long periods of time. Ask your healthcare provider about the best exercise plan for you.      Eat healthy foods: Healthy foods include fruits, vegetables, whole-grain breads, low-fat dairy products, beans, lean meats, and fish. Ask if you need to be on a special diet. Limit salt. Salt will make your body hold even more fluid.    Follow up with your healthcare provider as directed: Write down your questions so you remember to ask them during your visits.     Contact your healthcare provider if:     You have a fever or feel more tired than usual.      The veins in your legs look larger than usual. They may look full or bulging.      Your legs itch or feel heavy.      You have red or white areas or sores on your legs. The skin may also appear dimpled or have indentations.      You are gaining weight.      You have trouble moving your ankles.      The swelling does not go away, or other parts of your body swell.      You have questions or concerns about your condition or care.    Return to the emergency department if:     You cannot walk.      You feel faint or confused.       Your skin turns blue or gray.      Your leg feels warm, tender, and painful. It may be swollen and red.      You have chest pain or trouble breathing that is worse when you lie down.      You suddenly feel lightheaded and have trouble breathing.      You have new and sudden chest pain. You may have more pain when you take deep breaths or cough. You may also cough up blood.    Shortness of breath    Shortness of breath (dyspnea) means you have trouble breathing and could indicate a medical problem. Causes include lung disease, heart disease, low amount of red blood cells (anemia), poor physical fitness, being overweight, smoking, etc. Your health care provider today may not be able to find a cause for your shortness of breath after your exam. In this case, it is important to have a follow-up exam with your primary care physician as instructed. If medicines were prescribed, take them as directed for the full length of time directed. Refrain from tobacco products.    SEEK IMMEDIATE MEDICAL CARE IF YOU HAVE ANY OF THE FOLLOWING SYMPTOMS: worsening shortness of breath, chest pain, back pain, abdominal pain, fever, coughing up blood, lightheadedness/dizziness.

## 2022-10-13 NOTE — ED PROVIDER NOTE - OBJECTIVE STATEMENT
53 y/o male with a PMH of COPD, current daily smoker uses about 2.5L daily, hx of superficial thrombophlebitis of lower extremities, and MI 04/2022 at [facility] not on any medications presents to the ED for evaluation of left headache and neck pain x 4 days s/p fall out of his truck, and b/l lower extremity swelling x 1 week. pt reports he fell getting into his truck on sunday and hit the left side of his head. pt reports he feels sob worse with exertion. pt denies fever, chills, cough, dizziness, visual changes, weakness, numbness, tingling, use of blood thinners, loc, urinary or bowel retention or incontinence, leg pain, recent travel, recent trauma, recent surgeries, recent hospitalizations, hx of cancer, or use of hormones.

## 2022-10-13 NOTE — ED PROVIDER NOTE - PATIENT PORTAL LINK FT
You can access the FollowMyHealth Patient Portal offered by [facility] by registering at the following website: [email or web address]. By joining [email or web address]’s FollowMyHealth portal, you will also be able to view your health information using other applications (apps) compatible with our system.

## 2022-10-13 NOTE — ED PROVIDER NOTE - ATTENDING APP SHARED VISIT CONTRIBUTION OF CARE
52-year-old male past medical history of COPD, on 2.5 L of oxygen at home, superficial thrombophlebitis, MI presents to the ED for evaluation of left-sided headache and neck pain for the past week after falling out of his truck and hitting his head at that time.  Patient denied LOC at that time.  Patient reports shortness of breath.  Denies chest pain.     No fever, nausea, vomiting, chest pain, palpitations, diaphoresis, cough, headache, dizziness, numbness/tingling, abdominal pain, diarrhea, constipation, urinary symptoms, weakness, sick contacts, recent travel or rash.     Vital Signs: I have reviewed the initial vital signs.  Constitutional: Patient in no acute distress. Obese.   Integumentary: No rash.  ENT: MMM  NECK: Supple. no midline ttp or stepoffs, +TTP to L cervical paraspinal muscles.   Cardiovascular: RRR, radial pulses 2/4 bilaterally.   Respiratory: +expiratory wheezing, speaking full sentences.   Gastrointestinal: Abdomen soft, non-tender, non-distended, no rebound tenderness or guarding, no CVAT.   Musculoskeletal: +b/l LE edema, distal pulses intact, chronic skin changes, soft compartments.   Neurologic: AAOx3, motor 5/5 and sensation intact throughout upper and lower ext, CN II-XII intact, No focal deficits.

## 2023-05-17 NOTE — PATIENT PROFILE ADULT. - EXTENSIONS OF SELF_ADULT
Delivery Note:  Normal Spontaneous Vaginal Delivery     of vigorous Female , placed on mother's abdomen/chest after delivery.  Cord clamped and cut after delayed cord clamping; 3 Vessels , Complications: None .    Membranes:  Method of rupture: Spontaneous  on 2023  at 2:00 AM .  Fluid color was Clear .     Placenta:  Spontaneous  delivery of Intact  placenta.      Mother's Information      Lacerations    Episiotomy: None  Perineal laceration: 1st Degree  Perineal laceration repaired?: Yes  Other lacerations?: No  Repair suture: Other            Events of Labor:   delivery:  No   Antibiotics received during labor:  No   Labor complications:          Review the Delivery Report for Details     Samuel Null MD         None

## 2023-07-20 ENCOUNTER — INPATIENT (INPATIENT)
Facility: HOSPITAL | Age: 53
LOS: 4 days | Discharge: HOME | DRG: 383 | End: 2023-07-25
Attending: HOSPITALIST | Admitting: STUDENT IN AN ORGANIZED HEALTH CARE EDUCATION/TRAINING PROGRAM
Payer: MEDICAID

## 2023-07-20 VITALS
RESPIRATION RATE: 18 BRPM | WEIGHT: 315 LBS | DIASTOLIC BLOOD PRESSURE: 60 MMHG | HEART RATE: 96 BPM | TEMPERATURE: 98 F | SYSTOLIC BLOOD PRESSURE: 129 MMHG | OXYGEN SATURATION: 98 %

## 2023-07-20 DIAGNOSIS — Z90.49 ACQUIRED ABSENCE OF OTHER SPECIFIED PARTS OF DIGESTIVE TRACT: Chronic | ICD-10-CM

## 2023-07-20 DIAGNOSIS — Z96.659 PRESENCE OF UNSPECIFIED ARTIFICIAL KNEE JOINT: Chronic | ICD-10-CM

## 2023-07-20 DIAGNOSIS — Z98.890 OTHER SPECIFIED POSTPROCEDURAL STATES: Chronic | ICD-10-CM

## 2023-07-20 DIAGNOSIS — R09.89 OTHER SPECIFIED SYMPTOMS AND SIGNS INVOLVING THE CIRCULATORY AND RESPIRATORY SYSTEMS: ICD-10-CM

## 2023-07-20 DIAGNOSIS — L03.116 CELLULITIS OF LEFT LOWER LIMB: ICD-10-CM

## 2023-07-20 LAB
ALBUMIN SERPL ELPH-MCNC: 3.8 G/DL — SIGNIFICANT CHANGE UP (ref 3.5–5.2)
ALP SERPL-CCNC: 91 U/L — SIGNIFICANT CHANGE UP (ref 30–115)
ALT FLD-CCNC: 17 U/L — SIGNIFICANT CHANGE UP (ref 0–41)
ANION GAP SERPL CALC-SCNC: 9 MMOL/L — SIGNIFICANT CHANGE UP (ref 7–14)
APTT BLD: 175 SEC — CRITICAL HIGH (ref 27–39.2)
APTT BLD: 26.6 SEC — LOW (ref 27–39.2)
AST SERPL-CCNC: 16 U/L — SIGNIFICANT CHANGE UP (ref 0–41)
BASOPHILS # BLD AUTO: 0.06 K/UL — SIGNIFICANT CHANGE UP (ref 0–0.2)
BASOPHILS NFR BLD AUTO: 0.4 % — SIGNIFICANT CHANGE UP (ref 0–1)
BILIRUB SERPL-MCNC: 0.4 MG/DL — SIGNIFICANT CHANGE UP (ref 0.2–1.2)
BUN SERPL-MCNC: 19 MG/DL — SIGNIFICANT CHANGE UP (ref 10–20)
CALCIUM SERPL-MCNC: 8.6 MG/DL — SIGNIFICANT CHANGE UP (ref 8.4–10.5)
CHLORIDE SERPL-SCNC: 92 MMOL/L — LOW (ref 98–110)
CO2 SERPL-SCNC: 36 MMOL/L — HIGH (ref 17–32)
CREAT SERPL-MCNC: 1 MG/DL — SIGNIFICANT CHANGE UP (ref 0.7–1.5)
EGFR: 89 ML/MIN/1.73M2 — SIGNIFICANT CHANGE UP
EOSINOPHIL # BLD AUTO: 0.35 K/UL — SIGNIFICANT CHANGE UP (ref 0–0.7)
EOSINOPHIL NFR BLD AUTO: 2.5 % — SIGNIFICANT CHANGE UP (ref 0–8)
GLUCOSE SERPL-MCNC: 181 MG/DL — HIGH (ref 70–99)
HCT VFR BLD CALC: 44.2 % — SIGNIFICANT CHANGE UP (ref 42–52)
HCT VFR BLD CALC: 46.2 % — SIGNIFICANT CHANGE UP (ref 42–52)
HGB BLD-MCNC: 14.4 G/DL — SIGNIFICANT CHANGE UP (ref 14–18)
HGB BLD-MCNC: 15 G/DL — SIGNIFICANT CHANGE UP (ref 14–18)
IMM GRANULOCYTES NFR BLD AUTO: 0.5 % — HIGH (ref 0.1–0.3)
INR BLD: 1.05 RATIO — SIGNIFICANT CHANGE UP (ref 0.65–1.3)
LACTATE SERPL-SCNC: 1.7 MMOL/L — SIGNIFICANT CHANGE UP (ref 0.7–2)
LYMPHOCYTES # BLD AUTO: 1.69 K/UL — SIGNIFICANT CHANGE UP (ref 1.2–3.4)
LYMPHOCYTES # BLD AUTO: 12.2 % — LOW (ref 20.5–51.1)
MCHC RBC-ENTMCNC: 31.7 PG — HIGH (ref 27–31)
MCHC RBC-ENTMCNC: 31.7 PG — HIGH (ref 27–31)
MCHC RBC-ENTMCNC: 32.5 G/DL — SIGNIFICANT CHANGE UP (ref 32–37)
MCHC RBC-ENTMCNC: 32.6 G/DL — SIGNIFICANT CHANGE UP (ref 32–37)
MCV RBC AUTO: 97.4 FL — HIGH (ref 80–94)
MCV RBC AUTO: 97.7 FL — HIGH (ref 80–94)
MONOCYTES # BLD AUTO: 0.81 K/UL — HIGH (ref 0.1–0.6)
MONOCYTES NFR BLD AUTO: 5.9 % — SIGNIFICANT CHANGE UP (ref 1.7–9.3)
NEUTROPHILS # BLD AUTO: 10.82 K/UL — HIGH (ref 1.4–6.5)
NEUTROPHILS NFR BLD AUTO: 78.5 % — HIGH (ref 42.2–75.2)
NRBC # BLD: 0 /100 WBCS — SIGNIFICANT CHANGE UP (ref 0–0)
NRBC # BLD: 0 /100 WBCS — SIGNIFICANT CHANGE UP (ref 0–0)
PLATELET # BLD AUTO: 172 K/UL — SIGNIFICANT CHANGE UP (ref 130–400)
PLATELET # BLD AUTO: 185 K/UL — SIGNIFICANT CHANGE UP (ref 130–400)
PMV BLD: 11.3 FL — HIGH (ref 7.4–10.4)
PMV BLD: 11.5 FL — HIGH (ref 7.4–10.4)
POTASSIUM SERPL-MCNC: 3.9 MMOL/L — SIGNIFICANT CHANGE UP (ref 3.5–5)
POTASSIUM SERPL-SCNC: 3.9 MMOL/L — SIGNIFICANT CHANGE UP (ref 3.5–5)
PROT SERPL-MCNC: 6.9 G/DL — SIGNIFICANT CHANGE UP (ref 6–8)
PROTHROM AB SERPL-ACNC: 12 SEC — SIGNIFICANT CHANGE UP (ref 9.95–12.87)
RBC # BLD: 4.54 M/UL — LOW (ref 4.7–6.1)
RBC # BLD: 4.73 M/UL — SIGNIFICANT CHANGE UP (ref 4.7–6.1)
RBC # FLD: 12.5 % — SIGNIFICANT CHANGE UP (ref 11.5–14.5)
RBC # FLD: 12.7 % — SIGNIFICANT CHANGE UP (ref 11.5–14.5)
SODIUM SERPL-SCNC: 137 MMOL/L — SIGNIFICANT CHANGE UP (ref 135–146)
WBC # BLD: 11.68 K/UL — HIGH (ref 4.8–10.8)
WBC # BLD: 13.8 K/UL — HIGH (ref 4.8–10.8)
WBC # FLD AUTO: 11.68 K/UL — HIGH (ref 4.8–10.8)
WBC # FLD AUTO: 13.8 K/UL — HIGH (ref 4.8–10.8)

## 2023-07-20 PROCEDURE — 93010 ELECTROCARDIOGRAM REPORT: CPT

## 2023-07-20 PROCEDURE — 71045 X-RAY EXAM CHEST 1 VIEW: CPT

## 2023-07-20 PROCEDURE — 83735 ASSAY OF MAGNESIUM: CPT

## 2023-07-20 PROCEDURE — 85027 COMPLETE CBC AUTOMATED: CPT

## 2023-07-20 PROCEDURE — 85025 COMPLETE CBC W/AUTO DIFF WBC: CPT

## 2023-07-20 PROCEDURE — 85610 PROTHROMBIN TIME: CPT

## 2023-07-20 PROCEDURE — 97161 PT EVAL LOW COMPLEX 20 MIN: CPT | Mod: GP

## 2023-07-20 PROCEDURE — 73701 CT LOWER EXTREMITY W/DYE: CPT | Mod: 26,LT,MA

## 2023-07-20 PROCEDURE — 99285 EMERGENCY DEPT VISIT HI MDM: CPT

## 2023-07-20 PROCEDURE — 36415 COLL VENOUS BLD VENIPUNCTURE: CPT

## 2023-07-20 PROCEDURE — 87086 URINE CULTURE/COLONY COUNT: CPT

## 2023-07-20 PROCEDURE — 93970 EXTREMITY STUDY: CPT

## 2023-07-20 PROCEDURE — 85730 THROMBOPLASTIN TIME PARTIAL: CPT

## 2023-07-20 PROCEDURE — 97116 GAIT TRAINING THERAPY: CPT | Mod: GP

## 2023-07-20 PROCEDURE — 93306 TTE W/DOPPLER COMPLETE: CPT

## 2023-07-20 PROCEDURE — 71045 X-RAY EXAM CHEST 1 VIEW: CPT | Mod: 26

## 2023-07-20 PROCEDURE — 99223 1ST HOSP IP/OBS HIGH 75: CPT

## 2023-07-20 PROCEDURE — 93970 EXTREMITY STUDY: CPT | Mod: 26

## 2023-07-20 PROCEDURE — 93005 ELECTROCARDIOGRAM TRACING: CPT

## 2023-07-20 PROCEDURE — 94640 AIRWAY INHALATION TREATMENT: CPT

## 2023-07-20 PROCEDURE — 80053 COMPREHEN METABOLIC PANEL: CPT

## 2023-07-20 RX ORDER — HEPARIN SODIUM 5000 [USP'U]/ML
10000 INJECTION INTRAVENOUS; SUBCUTANEOUS EVERY 6 HOURS
Refills: 0 | Status: DISCONTINUED | OUTPATIENT
Start: 2023-07-20 | End: 2023-07-21

## 2023-07-20 RX ORDER — POTASSIUM BICARBONATE 978 MG/1
1 TABLET, EFFERVESCENT ORAL
Refills: 0 | DISCHARGE

## 2023-07-20 RX ORDER — HEPARIN SODIUM 5000 [USP'U]/ML
5000 INJECTION INTRAVENOUS; SUBCUTANEOUS EVERY 6 HOURS
Refills: 0 | Status: DISCONTINUED | OUTPATIENT
Start: 2023-07-20 | End: 2023-07-21

## 2023-07-20 RX ORDER — BUDESONIDE, MICRONIZED 100 %
0.5 POWDER (GRAM) MISCELLANEOUS
Refills: 0 | Status: DISCONTINUED | OUTPATIENT
Start: 2023-07-20 | End: 2023-07-25

## 2023-07-20 RX ORDER — LOSARTAN POTASSIUM 100 MG/1
1 TABLET, FILM COATED ORAL
Refills: 0 | DISCHARGE

## 2023-07-20 RX ORDER — HYDROXYZINE HCL 10 MG
0 TABLET ORAL
Qty: 0 | Refills: 0 | DISCHARGE

## 2023-07-20 RX ORDER — PANTOPRAZOLE SODIUM 20 MG/1
40 TABLET, DELAYED RELEASE ORAL
Refills: 0 | Status: DISCONTINUED | OUTPATIENT
Start: 2023-07-20 | End: 2023-07-25

## 2023-07-20 RX ORDER — VANCOMYCIN HCL 1 G
2700 VIAL (EA) INTRAVENOUS ONCE
Refills: 0 | Status: COMPLETED | OUTPATIENT
Start: 2023-07-20 | End: 2023-07-20

## 2023-07-20 RX ORDER — ALBUTEROL 90 UG/1
2 AEROSOL, METERED ORAL EVERY 6 HOURS
Refills: 0 | Status: DISCONTINUED | OUTPATIENT
Start: 2023-07-20 | End: 2023-07-20

## 2023-07-20 RX ORDER — ASPIRIN/CALCIUM CARB/MAGNESIUM 324 MG
81 TABLET ORAL DAILY
Refills: 0 | Status: DISCONTINUED | OUTPATIENT
Start: 2023-07-20 | End: 2023-07-25

## 2023-07-20 RX ORDER — ATORVASTATIN CALCIUM 80 MG/1
10 TABLET, FILM COATED ORAL AT BEDTIME
Refills: 0 | Status: DISCONTINUED | OUTPATIENT
Start: 2023-07-20 | End: 2023-07-25

## 2023-07-20 RX ORDER — METOPROLOL TARTRATE 50 MG
1.5 TABLET ORAL
Refills: 0 | DISCHARGE

## 2023-07-20 RX ORDER — ONDANSETRON 8 MG/1
4 TABLET, FILM COATED ORAL EVERY 8 HOURS
Refills: 0 | Status: DISCONTINUED | OUTPATIENT
Start: 2023-07-20 | End: 2023-07-25

## 2023-07-20 RX ORDER — VANCOMYCIN HCL 1 G
2000 VIAL (EA) INTRAVENOUS EVERY 12 HOURS
Refills: 0 | Status: DISCONTINUED | OUTPATIENT
Start: 2023-07-20 | End: 2023-07-21

## 2023-07-20 RX ORDER — HYDROMORPHONE HYDROCHLORIDE 2 MG/ML
1 INJECTION INTRAMUSCULAR; INTRAVENOUS; SUBCUTANEOUS ONCE
Refills: 0 | Status: DISCONTINUED | OUTPATIENT
Start: 2023-07-20 | End: 2023-07-20

## 2023-07-20 RX ORDER — ENOXAPARIN SODIUM 100 MG/ML
40 INJECTION SUBCUTANEOUS EVERY 24 HOURS
Refills: 0 | Status: DISCONTINUED | OUTPATIENT
Start: 2023-07-20 | End: 2023-07-20

## 2023-07-20 RX ORDER — ALBUTEROL 90 UG/1
1 AEROSOL, METERED ORAL
Refills: 0 | DISCHARGE

## 2023-07-20 RX ORDER — TIOTROPIUM BROMIDE 18 UG/1
2 CAPSULE ORAL; RESPIRATORY (INHALATION) DAILY
Refills: 0 | Status: DISCONTINUED | OUTPATIENT
Start: 2023-07-20 | End: 2023-07-20

## 2023-07-20 RX ORDER — ASPIRIN/CALCIUM CARB/MAGNESIUM 324 MG
325 TABLET ORAL DAILY
Refills: 0 | Status: DISCONTINUED | OUTPATIENT
Start: 2023-07-20 | End: 2023-07-20

## 2023-07-20 RX ORDER — LANOLIN ALCOHOL/MO/W.PET/CERES
3 CREAM (GRAM) TOPICAL AT BEDTIME
Refills: 0 | Status: DISCONTINUED | OUTPATIENT
Start: 2023-07-20 | End: 2023-07-25

## 2023-07-20 RX ORDER — METOPROLOL TARTRATE 50 MG
25 TABLET ORAL DAILY
Refills: 0 | Status: DISCONTINUED | OUTPATIENT
Start: 2023-07-20 | End: 2023-07-20

## 2023-07-20 RX ORDER — ACETAMINOPHEN 500 MG
650 TABLET ORAL EVERY 6 HOURS
Refills: 0 | Status: DISCONTINUED | OUTPATIENT
Start: 2023-07-20 | End: 2023-07-25

## 2023-07-20 RX ORDER — ENOXAPARIN SODIUM 100 MG/ML
140 INJECTION SUBCUTANEOUS ONCE
Refills: 0 | Status: DISCONTINUED | OUTPATIENT
Start: 2023-07-20 | End: 2023-07-20

## 2023-07-20 RX ORDER — IPRATROPIUM/ALBUTEROL SULFATE 18-103MCG
3 AEROSOL WITH ADAPTER (GRAM) INHALATION EVERY 6 HOURS
Refills: 0 | Status: DISCONTINUED | OUTPATIENT
Start: 2023-07-20 | End: 2023-07-25

## 2023-07-20 RX ORDER — METOPROLOL TARTRATE 50 MG
12.5 TABLET ORAL EVERY 12 HOURS
Refills: 0 | Status: DISCONTINUED | OUTPATIENT
Start: 2023-07-20 | End: 2023-07-25

## 2023-07-20 RX ORDER — ATORVASTATIN CALCIUM 80 MG/1
1 TABLET, FILM COATED ORAL
Refills: 0 | DISCHARGE

## 2023-07-20 RX ORDER — HEPARIN SODIUM 5000 [USP'U]/ML
INJECTION INTRAVENOUS; SUBCUTANEOUS
Qty: 25000 | Refills: 0 | Status: DISCONTINUED | OUTPATIENT
Start: 2023-07-20 | End: 2023-07-21

## 2023-07-20 RX ORDER — APIXABAN 2.5 MG/1
10 TABLET, FILM COATED ORAL EVERY 12 HOURS
Refills: 0 | Status: DISCONTINUED | OUTPATIENT
Start: 2023-07-20 | End: 2023-07-20

## 2023-07-20 RX ADMIN — Medication 12.5 MILLIGRAM(S): at 17:16

## 2023-07-20 RX ADMIN — Medication 3 MILLILITER(S): at 13:57

## 2023-07-20 RX ADMIN — ATORVASTATIN CALCIUM 10 MILLIGRAM(S): 80 TABLET, FILM COATED ORAL at 22:00

## 2023-07-20 RX ADMIN — HYDROMORPHONE HYDROCHLORIDE 1 MILLIGRAM(S): 2 INJECTION INTRAMUSCULAR; INTRAVENOUS; SUBCUTANEOUS at 03:38

## 2023-07-20 RX ADMIN — ENOXAPARIN SODIUM 40 MILLIGRAM(S): 100 INJECTION SUBCUTANEOUS at 11:38

## 2023-07-20 RX ADMIN — Medication 250 MILLIGRAM(S): at 17:16

## 2023-07-20 RX ADMIN — HEPARIN SODIUM 2400 UNIT(S)/HR: 5000 INJECTION INTRAVENOUS; SUBCUTANEOUS at 15:53

## 2023-07-20 RX ADMIN — Medication 166.67 MILLIGRAM(S): at 05:23

## 2023-07-20 RX ADMIN — Medication 81 MILLIGRAM(S): at 11:39

## 2023-07-20 NOTE — ED ADULT NURSE NOTE - OBJECTIVE STATEMENT
Presented to ED c/o L leg swelling and redness x1 week. As per pt, it has spread to his groin. Presented to ED c/o L leg swelling and redness x1 week. As per pt, it has spread to his groin. Pt uses supplemental o2 as needed at home, 2.5L NC

## 2023-07-20 NOTE — H&P ADULT - HISTORY OF PRESENT ILLNESS
52 yo m hx of copd (On 2.5L/min O2 PRN at home), CAD (hx of MI in April 2021), LAITH (On CPAP), HLD and obesity, c/o pain with redness and swelling to LLE, History goes back to 1 week prior to presentation when patient complained of Left foot pain and swelling which resolved after massaging the foot. The second day, the swelling and the pain extended to the calf and then to the upper thigh 3 days later. Patient declines any recent scratch, insect bite, or any new lesion before symptoms began, He has no fever, no chills, no chest pain, no diarrhea, no new cough. He has no recent travel, no recent antibiotics uses.    ED vitals were within normal range, no sings of sepsis    Labs are significant for wbc of 13.8    CT lower extremity: Subcutaneous fat infiltration and edema of the entire left lower extremity, correlated to cellulitis. No evidence of necrotizing fasciitis.Trace knee joint effusion Yes...

## 2023-07-20 NOTE — H&P ADULT - NSICDXPASTSURGICALHX_GEN_ALL_CORE_FT
PAST SURGICAL HISTORY:  History of cholecystectomy     History of knee replacement     S/P flap graft

## 2023-07-20 NOTE — H&P ADULT - NSHPPHYSICALEXAM_GEN_ALL_CORE
GENERAL: In distress, complaining of left lower limb pain  HEAD:  Atraumatic, normocephalic  EYES: EOMI, PERRLA, conjunctiva and sclera clear  ENT: Moist mucous membranes  NECK: Supple, no JVD  HEART: Regular rate and rhythm, no murmurs, rubs, or gallops  LUNGS: B/l wheezes, no crackles, or rhonchi  ABDOMEN: Soft, nontender, +BS  EXTREMITIES: Left lower limb non-pitting edema, redness and hotness extending from the foot to the upper thighs, 2+ peripheral pulses bilaterally. No clubbing, or cyanosis  NERVOUS SYSTEM:  A&Ox3, no focal deficits

## 2023-07-20 NOTE — ED ADULT NURSE NOTE - NSFALLUNIVINTERV_ED_ALL_ED
Bed/Stretcher in lowest position, wheels locked, appropriate side rails in place/Call bell, personal items and telephone in reach/Instruct patient to call for assistance before getting out of bed/chair/stretcher/Non-slip footwear applied when patient is off stretcher/[place] to call system/Physically safe environment - no spills, clutter or unnecessary equipment/Purposeful proactive rounding/Room/bathroom lighting operational, light cord in reach

## 2023-07-20 NOTE — ED PROVIDER NOTE - CARE PLAN
Assessment and plan of treatment:	LE cellulitis  labs, imaging, abx, supportive care   1 Principal Discharge DX:	Cellulitis of left lower leg  Assessment and plan of treatment:	LE cellulitis  labs, imaging, abx, supportive care

## 2023-07-20 NOTE — H&P ADULT - ATTENDING COMMENTS
54 yo m hx of copd (On 2.5L/min O2 PRN at home), CAD (hx of MI in April 2021), LAITH (On CPAP), HLD and obesity, presented for cellulitis, no signs of sepsis    Labs Reviewed - leukocytosis noted   CXR independently reviewed - no focal consolidation or effusion  ECG independently reviewed - NSR     #Cellulitis  - Left lower limb non-pitting edema, redness and hotness extending from the foot to the upper thighs  - No fever, no chills  - WBC: 13.8  - CT: Cellulitis, no necrotizing fascitis  c/w Vancomycin (due to hx of anaphylaxis to Penicillin)  - Cs ID pending    #LLE pain 2/2 Extensive DVT   - Continue Tylenol standing  VA Duplex Lower Ext Vein Scan, Bilat (07.20.23 @ 10:07)     Acute appearing deep venous thrombosis of the left common femoral,   femoral and popliteal veins. Superficial thrombophlebitis of the left   greater saphenous vein.    starting on Heparin Drip   APTT to be done q6 - transition to DOAC upon discharge

## 2023-07-20 NOTE — ED ADULT TRIAGE NOTE - CHIEF COMPLAINT QUOTE
PT presents to the ED c/o of L Lower extremity redness and swelling starting about a week ago and got worse.

## 2023-07-20 NOTE — ED PROVIDER NOTE - PHYSICAL EXAMINATION
VITAL SIGNS: I have reviewed nursing notes and confirm.  CONSTITUTIONAL: Well-developed; well-nourished;   SKIN: Skin exam is warm and dry, no acute rash.  HEAD: Normocephalic; atraumatic.  EYES: PERRL, EOM intact; conjunctiva and sclera clear.  ENT: No nasal discharge; airway clear.   NECK: Supple; non tender.  CARD:+ S1, S2   RESP: No wheezes, rales or rhonchi.  ABD: Normal bowel sounds; soft; non-distended; non-tender;  EXT: RLE marked erythema, swelling, tender to entire leg, up to inguinal fold. no  involvement. tender, no crep. indurated.   LYMPH: No acute adenopathy.  NEURO: Alert. Grossly unremarkable. No focal deficits.  PSYCH: Cooperative, appropriate.

## 2023-07-20 NOTE — H&P ADULT - ASSESSMENT
54 yo m hx of copd (On 2.5L/min O2 PRN at home), CAD (hx of MI in April 2021), LAITH (On CPAP), HLD and obesity, presented for cellulitis, no signs of sepsis    #Cellulitis  - Left lower limb non-pitting edema, redness and hotness extending from the foot to the upper thighs  - No fever, no chills  - WBC: 13.8  - CT: Cellulitis, no necrotizing fascitis  - Started on Vancomycin (due to hx of anaphylaxis to Penicillin)  - Cs ID pending  - Pending lower limbs duplex     #LLE pain  - Continue Tylenol standing  - Pending lower limbs duplex       #COPD/LAITH  - B/L wheezes  - On 2L/min O2  - Continue DuoNeb   - Pending CXR    #CAD  - Continue Aspirin and Metoprolol    #Obesity    #HLD  - Continue Atorvastatin    Misc:  DVT ppx: Enoxaparin 40mf SC daily  GI ppx: Pantoprazol 40 mg PO daily  Diet: Regular, low fat  Activity: As tolerated, PT ordered  Dispo: Acute, floor 54 yo m hx of copd (On 2.5L/min O2 PRN at home), CAD (hx of MI in April 2021), LAITH (On CPAP), HLD and obesity, presented for cellulitis, no signs of sepsis    #Cellulitis  - Left lower limb non-pitting edema, redness and hotness extending from the foot to the upper thighs  - No fever, no chills  - WBC: 13.8  - CT: Cellulitis, no necrotizing fascitis  - Started on Vancomycin (due to hx of anaphylaxis to Penicillin)  - Cs ID pending  - Pending lower limbs duplex     #LLE pain  - Continue Tylenol standing  - Pending lower limbs duplex       #COPD/LAITH  - B/L wheezes  - On 2L/min O2  - Continue DuoNeb   - Pending CXR    #CAD  - Continue Aspirin and Metoprolol    #Obesity    #HLD  - Continue Atorvastatin    Misc:  DVT ppx: Enoxaparin 40mf SC daily  GI ppx: Pantoprazol 40 mg PO daily  Diet: Regular, low fat  Activity: As tolerated, PT ordered  Code: Full Code  Dispo: Acute, floor

## 2023-07-20 NOTE — ED ADULT NURSE NOTE - NSFALLCONCLUSION_ED_ALL_ED
1/12/2018      Neeru Dolan  [address]  [place] [address]    To Whom It May Concern:     Please consider Ms. Dolan a candidate for an adjustable work station to improve her musculoskeletal health.     If you have additional questions or concerns, please call the office at [phone] to review.    Sincerely,      Cheng Simmons, [facility]  [address] [place] [id number] [phone]  
Universal Safety Interventions

## 2023-07-20 NOTE — H&P ADULT - NSICDXPASTMEDICALHX_GEN_ALL_CORE_FT
PAST MEDICAL HISTORY:  Anxiety     CAD (coronary artery disease)     COPD (chronic obstructive pulmonary disease)     HLD (hyperlipidemia)     LAITH on CPAP

## 2023-07-20 NOTE — PATIENT PROFILE ADULT - FALL HARM RISK - HARM RISK INTERVENTIONS

## 2023-07-20 NOTE — ED PROVIDER NOTE - PROGRESS NOTE DETAILS
will admit for cellulitis. lactate nml. Ct no nec fasc.  will order duplex but unavailable until day time, MAR aware, will f/u results.

## 2023-07-20 NOTE — ED ADULT NURSE NOTE - NSFALLRISKASMT_ED_ALL_ED_DT
Future appt:    Last Appointment with provider:  11/23/2019    Last appointment at [facility]:  Choles 20-Jul-2023 02:24

## 2023-07-20 NOTE — ED PROVIDER NOTE - OBJECTIVE STATEMENT
52 yo m hx of copd, CAD/MI, c/o pain to LLE for 1 week, acutely worse the last 2 days, with redness, swelling. started on lower leg and now involving most of the LE. no fever. no trauma.

## 2023-07-20 NOTE — ED ADULT NURSE REASSESSMENT NOTE - NS ED NURSE REASSESS COMMENT FT1
patient blood drawn for heparin protocol and sent to the lab, results pending. Patient tolerated well.  Will continue to monitor. MD aware.

## 2023-07-20 NOTE — PHARMACOTHERAPY INTERVENTION NOTE - COMMENTS
I spoke with Dr Bradford and recommended to adjust frequency of Lopressor 25 mg from q24h to q12h. As per Dr Bradford will adjust order to Lopressor 12.5 mg q12h and if necessary will titrate the dose 
Vancomycin 2000 mg q12h  Peak- 35.8  Trough- 15.6

## 2023-07-21 LAB
ALBUMIN SERPL ELPH-MCNC: 3.5 G/DL — SIGNIFICANT CHANGE UP (ref 3.5–5.2)
ALP SERPL-CCNC: 83 U/L — SIGNIFICANT CHANGE UP (ref 30–115)
ALT FLD-CCNC: 15 U/L — SIGNIFICANT CHANGE UP (ref 0–41)
ANION GAP SERPL CALC-SCNC: 7 MMOL/L — SIGNIFICANT CHANGE UP (ref 7–14)
APTT BLD: 41.1 SEC — HIGH (ref 27–39.2)
AST SERPL-CCNC: 16 U/L — SIGNIFICANT CHANGE UP (ref 0–41)
BASOPHILS # BLD AUTO: 0.04 K/UL — SIGNIFICANT CHANGE UP (ref 0–0.2)
BASOPHILS NFR BLD AUTO: 0.4 % — SIGNIFICANT CHANGE UP (ref 0–1)
BILIRUB SERPL-MCNC: 0.4 MG/DL — SIGNIFICANT CHANGE UP (ref 0.2–1.2)
BUN SERPL-MCNC: 13 MG/DL — SIGNIFICANT CHANGE UP (ref 10–20)
CALCIUM SERPL-MCNC: 8.6 MG/DL — SIGNIFICANT CHANGE UP (ref 8.4–10.4)
CHLORIDE SERPL-SCNC: 96 MMOL/L — LOW (ref 98–110)
CO2 SERPL-SCNC: 36 MMOL/L — HIGH (ref 17–32)
CREAT SERPL-MCNC: 0.8 MG/DL — SIGNIFICANT CHANGE UP (ref 0.7–1.5)
CULTURE RESULTS: NO GROWTH — SIGNIFICANT CHANGE UP
EGFR: 105 ML/MIN/1.73M2 — SIGNIFICANT CHANGE UP
EOSINOPHIL # BLD AUTO: 0.38 K/UL — SIGNIFICANT CHANGE UP (ref 0–0.7)
EOSINOPHIL NFR BLD AUTO: 3.5 % — SIGNIFICANT CHANGE UP (ref 0–8)
GLUCOSE SERPL-MCNC: 198 MG/DL — HIGH (ref 70–99)
HCT VFR BLD CALC: 44.8 % — SIGNIFICANT CHANGE UP (ref 42–52)
HGB BLD-MCNC: 14 G/DL — SIGNIFICANT CHANGE UP (ref 14–18)
IMM GRANULOCYTES NFR BLD AUTO: 0.8 % — HIGH (ref 0.1–0.3)
INR BLD: 1.06 RATIO — SIGNIFICANT CHANGE UP (ref 0.65–1.3)
LYMPHOCYTES # BLD AUTO: 1.92 K/UL — SIGNIFICANT CHANGE UP (ref 1.2–3.4)
LYMPHOCYTES # BLD AUTO: 17.6 % — LOW (ref 20.5–51.1)
MAGNESIUM SERPL-MCNC: 2.2 MG/DL — SIGNIFICANT CHANGE UP (ref 1.8–2.4)
MCHC RBC-ENTMCNC: 30.7 PG — SIGNIFICANT CHANGE UP (ref 27–31)
MCHC RBC-ENTMCNC: 31.3 G/DL — LOW (ref 32–37)
MCV RBC AUTO: 98.2 FL — HIGH (ref 80–94)
MONOCYTES # BLD AUTO: 0.79 K/UL — HIGH (ref 0.1–0.6)
MONOCYTES NFR BLD AUTO: 7.2 % — SIGNIFICANT CHANGE UP (ref 1.7–9.3)
NEUTROPHILS # BLD AUTO: 7.72 K/UL — HIGH (ref 1.4–6.5)
NEUTROPHILS NFR BLD AUTO: 70.5 % — SIGNIFICANT CHANGE UP (ref 42.2–75.2)
NRBC # BLD: 0 /100 WBCS — SIGNIFICANT CHANGE UP (ref 0–0)
PLATELET # BLD AUTO: 191 K/UL — SIGNIFICANT CHANGE UP (ref 130–400)
PMV BLD: 10.9 FL — HIGH (ref 7.4–10.4)
POTASSIUM SERPL-MCNC: 4.3 MMOL/L — SIGNIFICANT CHANGE UP (ref 3.5–5)
POTASSIUM SERPL-SCNC: 4.3 MMOL/L — SIGNIFICANT CHANGE UP (ref 3.5–5)
PROT SERPL-MCNC: 6.4 G/DL — SIGNIFICANT CHANGE UP (ref 6–8)
PROTHROM AB SERPL-ACNC: 12.1 SEC — SIGNIFICANT CHANGE UP (ref 9.95–12.87)
RBC # BLD: 4.56 M/UL — LOW (ref 4.7–6.1)
RBC # FLD: 12.6 % — SIGNIFICANT CHANGE UP (ref 11.5–14.5)
SODIUM SERPL-SCNC: 139 MMOL/L — SIGNIFICANT CHANGE UP (ref 135–146)
SPECIMEN SOURCE: SIGNIFICANT CHANGE UP
WBC # BLD: 10.94 K/UL — HIGH (ref 4.8–10.8)
WBC # FLD AUTO: 10.94 K/UL — HIGH (ref 4.8–10.8)

## 2023-07-21 PROCEDURE — 71045 X-RAY EXAM CHEST 1 VIEW: CPT | Mod: 26

## 2023-07-21 PROCEDURE — 99221 1ST HOSP IP/OBS SF/LOW 40: CPT

## 2023-07-21 PROCEDURE — 99233 SBSQ HOSP IP/OBS HIGH 50: CPT

## 2023-07-21 RX ORDER — CEFTRIAXONE 500 MG/1
2000 INJECTION, POWDER, FOR SOLUTION INTRAMUSCULAR; INTRAVENOUS EVERY 12 HOURS
Refills: 0 | Status: DISCONTINUED | OUTPATIENT
Start: 2023-07-21 | End: 2023-07-25

## 2023-07-21 RX ORDER — LOSARTAN POTASSIUM 100 MG/1
25 TABLET, FILM COATED ORAL DAILY
Refills: 0 | Status: DISCONTINUED | OUTPATIENT
Start: 2023-07-21 | End: 2023-07-25

## 2023-07-21 RX ORDER — LINEZOLID 600 MG/300ML
600 INJECTION, SOLUTION INTRAVENOUS EVERY 12 HOURS
Refills: 0 | Status: DISCONTINUED | OUTPATIENT
Start: 2023-07-21 | End: 2023-07-25

## 2023-07-21 RX ORDER — APIXABAN 2.5 MG/1
2 TABLET, FILM COATED ORAL
Qty: 360 | Refills: 0
Start: 2023-07-21 | End: 2023-10-18

## 2023-07-21 RX ORDER — AZTREONAM 2 G
2000 VIAL (EA) INJECTION EVERY 8 HOURS
Refills: 0 | Status: DISCONTINUED | OUTPATIENT
Start: 2023-07-21 | End: 2023-07-21

## 2023-07-21 RX ORDER — FUROSEMIDE 40 MG
40 TABLET ORAL DAILY
Refills: 0 | Status: DISCONTINUED | OUTPATIENT
Start: 2023-07-21 | End: 2023-07-22

## 2023-07-21 RX ORDER — APIXABAN 2.5 MG/1
10 TABLET, FILM COATED ORAL EVERY 12 HOURS
Refills: 0 | Status: DISCONTINUED | OUTPATIENT
Start: 2023-07-21 | End: 2023-07-25

## 2023-07-21 RX ORDER — OXYCODONE HYDROCHLORIDE 5 MG/1
10 TABLET ORAL EVERY 4 HOURS
Refills: 0 | Status: DISCONTINUED | OUTPATIENT
Start: 2023-07-21 | End: 2023-07-25

## 2023-07-21 RX ORDER — OXYCODONE AND ACETAMINOPHEN 5; 325 MG/1; MG/1
2 TABLET ORAL EVERY 4 HOURS
Refills: 0 | Status: DISCONTINUED | OUTPATIENT
Start: 2023-07-21 | End: 2023-07-21

## 2023-07-21 RX ORDER — HYDROMORPHONE HYDROCHLORIDE 2 MG/ML
1 INJECTION INTRAMUSCULAR; INTRAVENOUS; SUBCUTANEOUS EVERY 6 HOURS
Refills: 0 | Status: DISCONTINUED | OUTPATIENT
Start: 2023-07-21 | End: 2023-07-25

## 2023-07-21 RX ADMIN — Medication 12.5 MILLIGRAM(S): at 05:26

## 2023-07-21 RX ADMIN — Medication 250 MILLIGRAM(S): at 05:27

## 2023-07-21 RX ADMIN — Medication 100 MILLIGRAM(S): at 13:01

## 2023-07-21 RX ADMIN — CEFTRIAXONE 100 MILLIGRAM(S): 500 INJECTION, POWDER, FOR SOLUTION INTRAMUSCULAR; INTRAVENOUS at 17:39

## 2023-07-21 RX ADMIN — HEPARIN SODIUM 0 UNIT(S)/HR: 5000 INJECTION INTRAVENOUS; SUBCUTANEOUS at 00:06

## 2023-07-21 RX ADMIN — HYDROMORPHONE HYDROCHLORIDE 1 MILLIGRAM(S): 2 INJECTION INTRAMUSCULAR; INTRAVENOUS; SUBCUTANEOUS at 22:16

## 2023-07-21 RX ADMIN — HEPARIN SODIUM 0 UNIT(S)/HR: 5000 INJECTION INTRAVENOUS; SUBCUTANEOUS at 04:47

## 2023-07-21 RX ADMIN — PANTOPRAZOLE SODIUM 40 MILLIGRAM(S): 20 TABLET, DELAYED RELEASE ORAL at 05:26

## 2023-07-21 RX ADMIN — HYDROMORPHONE HYDROCHLORIDE 1 MILLIGRAM(S): 2 INJECTION INTRAMUSCULAR; INTRAVENOUS; SUBCUTANEOUS at 13:46

## 2023-07-21 RX ADMIN — APIXABAN 10 MILLIGRAM(S): 2.5 TABLET, FILM COATED ORAL at 17:39

## 2023-07-21 RX ADMIN — Medication 81 MILLIGRAM(S): at 11:38

## 2023-07-21 RX ADMIN — Medication 12.5 MILLIGRAM(S): at 17:38

## 2023-07-21 RX ADMIN — HEPARIN SODIUM 300 UNIT(S)/HR: 5000 INJECTION INTRAVENOUS; SUBCUTANEOUS at 10:02

## 2023-07-21 RX ADMIN — ATORVASTATIN CALCIUM 10 MILLIGRAM(S): 80 TABLET, FILM COATED ORAL at 21:56

## 2023-07-21 RX ADMIN — Medication 40 MILLIGRAM(S): at 11:38

## 2023-07-21 RX ADMIN — APIXABAN 10 MILLIGRAM(S): 2.5 TABLET, FILM COATED ORAL at 11:58

## 2023-07-21 RX ADMIN — LINEZOLID 300 MILLIGRAM(S): 600 INJECTION, SOLUTION INTRAVENOUS at 17:39

## 2023-07-21 RX ADMIN — HYDROMORPHONE HYDROCHLORIDE 1 MILLIGRAM(S): 2 INJECTION INTRAMUSCULAR; INTRAVENOUS; SUBCUTANEOUS at 13:11

## 2023-07-21 RX ADMIN — HYDROMORPHONE HYDROCHLORIDE 1 MILLIGRAM(S): 2 INJECTION INTRAMUSCULAR; INTRAVENOUS; SUBCUTANEOUS at 21:55

## 2023-07-21 NOTE — MEDICAL STUDENT PROGRESS NOTE(EDUCATION) - SUBJECTIVE AND OBJECTIVE BOX
SUBJECTIVE:    Patient is a 53y old Male who presents with a chief complaint of Left lower limb swelling and redness (21 Jul 2023 07:53)    Currently admitted to medicine with the primary diagnosis of Cellulitis of left lower leg       Today is hospital day 1d. This morning he is distressed and in pain.   Pt states the pain and swelling have extended to the proximal LLE. He describes the pain as pins and needles but reports no loss of sensation. He mentioned b/l knee pain.   He denies any recent trips or outdoor activities. He denies and N/V/D/F/HA.  Pt reported a recent weight gain of 55lb w/in 1 mo for which his Cardiologist put him on Lasix.       PAST MEDICAL & SURGICAL HISTORY  COPD (chronic obstructive pulmonary disease)    Anxiety    CAD (coronary artery disease)    LAITH on CPAP    HLD (hyperlipidemia)    History of knee replacement    History of cholecystectomy    S/P flap graft      SOCIAL HISTORY:  Negative for smoking/alcohol/drug use.     ALLERGIES:  penicillins (Other)    MEDICATIONS:  STANDING MEDICATIONS  albuterol/ipratropium for Nebulization 3 milliLiter(s) Nebulizer every 6 hours  aspirin 81 milliGRAM(s) Oral daily  atorvastatin 10 milliGRAM(s) Oral at bedtime  buDESOnide    Inhalation Suspension 0.5 milliGRAM(s) Inhalation two times a day  heparin  Infusion.  Unit(s)/Hr IV Continuous <Continuous>  metoprolol tartrate 12.5 milliGRAM(s) Oral every 12 hours  pantoprazole    Tablet 40 milliGRAM(s) Oral before breakfast  vancomycin  IVPB 2000 milliGRAM(s) IV Intermittent every 12 hours    PRN MEDICATIONS  acetaminophen     Tablet .. 650 milliGRAM(s) Oral every 6 hours PRN  aluminum hydroxide/magnesium hydroxide/simethicone Suspension 30 milliLiter(s) Oral every 4 hours PRN  heparin   Injectable 67509 Unit(s) IV Push every 6 hours PRN  heparin   Injectable 5000 Unit(s) IV Push every 6 hours PRN  melatonin 3 milliGRAM(s) Oral at bedtime PRN  ondansetron Injectable 4 milliGRAM(s) IV Push every 8 hours PRN  oxycodone    5 mG/acetaminophen 325 mG 1 Tablet(s) Oral every 6 hours PRN    VITALS:   T(F): 98.1  HR: 82  BP: 120/58  RR: 18  SpO2: 98%    LABS:                        14.4   11.68 )-----------( 172      ( 20 Jul 2023 22:14 )             44.2     07-20    137  |  92<L>  |  19  ----------------------------<  181<H>  3.9   |  36<H>  |  1.0    Ca    8.6      20 Jul 2023 04:20    TPro  6.9  /  Alb  3.8  /  TBili  0.4  /  DBili  x   /  AST  16  /  ALT  17  /  AlkPhos  91  07-20    PT/INR - ( 20 Jul 2023 04:20 )   PT: 12.00 sec;   INR: 1.05 ratio         PTT - ( 20 Jul 2023 22:14 )  PTT:175.0 sec  Urinalysis Basic - ( 20 Jul 2023 04:20 )    Color: x / Appearance: x / SG: x / pH: x  Gluc: 181 mg/dL / Ketone: x  / Bili: x / Urobili: x   Blood: x / Protein: x / Nitrite: x   Leuk Esterase: x / RBC: x / WBC x   Sq Epi: x / Non Sq Epi: x / Bacteria: x                RADIOLOGY:    PHYSICAL EXAM:  GEN: No acute distress  LUNGS: Clear to auscultation bilaterally   HEART: Regular  ABD: Soft, non-tender, non-distended.  EXT: NC/NC/NE/2+PP/IBANEZ/Skin Intact.   NEURO: AAOX3    Intravenous access:   NG tube:   Celeste Catheter:

## 2023-07-21 NOTE — CONSULT NOTE ADULT - SUBJECTIVE AND OBJECTIVE BOX
VASCULAR SURGERY CONSULT NOTE      HPI:  54 yo m hx of copd (On 2.5L/min O2 PRN at home), CAD (hx of MI in April 2021), LAITH (On CPAP), HLD and obesity, c/o pain with redness and swelling to LLE, History goes back to 1 week prior to presentation when patient complained of Left foot pain and swelling which resolved after massaging the foot. The second day, the swelling and the pain extended to the calf and then to the upper thigh 3 days later. Patient declines any recent scratch, insect bite, or any new lesion before symptoms began, He has no fever, no chills, no chest pain, no diarrhea, no new cough. He has no recent travel, no recent antibiotics uses.    Vascular was consulted for Mr Freddy d/t LLE DVT. Patient was seen and examined at bedside. Exam was notable severe edema, erythema and pain. Extremities were warm and no pallor present. Pain most notable on posterior calf. Patient had intact sensation and motor function. Following pulses were dopplerable; L POP and PT. DP not palp or dop.     ED vitals were within normal range, no sings of sepsis    Labs are significant for wbc of 13.8    CT lower extremity: Subcutaneous fat infiltration and edema of the entire left lower extremity, correlated to cellulitis. No evidence of necrotizing fasciitis.Trace knee joint effusion (20 Jul 2023 08:46)        PAST MEDICAL & SURGICAL HISTORY:  COPD (chronic obstructive pulmonary disease)      Anxiety      CAD (coronary artery disease)      LAITH on CPAP      HLD (hyperlipidemia)      History of knee replacement      History of cholecystectomy      S/P flap graft        penicillins (Other)    Home Medications:  aspirin 81 mg oral capsule: 1 orally once a day (20 Jul 2023 09:00)  atorvastatin 10 mg oral tablet: 1 tab(s) orally once a day (20 Jul 2023 09:05)  DuoNeb 0.5 mg-2.5 mg/3 mL inhalation solution: 3 milliliter(s) inhaled 4 times a day, As Needed (21 May 2018 19:47)  Effer-K 10 mEq oral tablet, effervescent: 1 tab(s) orally once a day (20 Jul 2023 09:05)  furosemide 40 mg oral tablet: 1 tab(s) orally once a day (20 Jul 2023 09:05)  losartan 25 mg oral tablet: 1 tab(s) orally once a day (20 Jul 2023 09:05)  Metoprolol Tartrate 25 mg oral tablet: 1.5 tab(s) orally once a day (20 Jul 2023 09:05)  Ventolin 90 mcg/inh inhalation aerosol: 1 puff(s) inhaled 4 times a day as needed for  shortness of breath and/or wheezing (20 Jul 2023 09:05)    No permtinent family history of PVD    REVIEW OF SYSTEMS:  GENERAL:                                         negative  SKIN:                                                 negative  OPTHALMOLOGIC:                          negative  ENMT:                                               negative  RESPIRATORY AND THORAX:        negative  CARDIOVASCULAR:                         negative  GASTROINTESTINAL:                       negative  NEPHROLOGY:                                  negative  MUSCULOSKELETAL:                       negative  NEUROLOGIC:                                   negative  PSYCHIATRIC:                                    negative  HEMATOLOGY/LYMPHATICS:         negative  ENDOCRINE:                                     negative  ALLERGIC/IMMUNOLOGIC:            negative    12 point ROS otherwise normal except as stated in HPI    PHYSICAL EXAM  Vital Signs Last 24 Hrs  T(C): 36.7 (21 Jul 2023 05:00), Max: 36.7 (21 Jul 2023 05:00)  T(F): 98.1 (21 Jul 2023 05:00), Max: 98.1 (21 Jul 2023 05:00)  HR: 82 (21 Jul 2023 05:00) (79 - 82)  BP: 120/58 (21 Jul 2023 05:00) (120/58 - 141/62)  BP(mean): --  RR: 18 (21 Jul 2023 05:00) (17 - 18)  SpO2: 98% (21 Jul 2023 05:00) (97% - 98%)    Parameters below as of 21 Jul 2023 05:00  Patient On (Oxygen Delivery Method): nasal cannula        Appearance: Normal	  HEENT:   Normal oral mucosa, PERRL, EOMI	  Neck: Supple, - JVD; Carotid Bruit   Cardiovascular: Normal S1 S2, No JVD, No murmurs,   Respiratory: Lungs clear to auscultation, No Rales, Rhonchi, Wheezing	  Gastrointestinal:  Soft, Non-tender, positive BS	  Skin: No rashes, No ecchymoses, No cyanosis  Extremities: Normal range of motion, No clubbing, cyanosis or edema  Vascular: Peripheral pulses palpable 2+ bilaterally  Neurologic: Non-focal  Psychiatry: A & O x 3, Mood & affect appropriate      PULSES:  Femoral:  Popliteal:  Dorsal Pedal:  Posterior Tibial:  Capillary:    MEDICATIONS:   MEDICATIONS  (STANDING):  albuterol/ipratropium for Nebulization 3 milliLiter(s) Nebulizer every 6 hours  apixaban 10 milliGRAM(s) Oral every 12 hours  aspirin 81 milliGRAM(s) Oral daily  atorvastatin 10 milliGRAM(s) Oral at bedtime  aztreonam  IVPB 2000 milliGRAM(s) IV Intermittent every 8 hours  buDESOnide    Inhalation Suspension 0.5 milliGRAM(s) Inhalation two times a day  cefTRIAXone   IVPB 2000 milliGRAM(s) IV Intermittent every 12 hours  furosemide   Injectable 40 milliGRAM(s) IV Push daily  linezolid  IVPB 600 milliGRAM(s) IV Intermittent every 12 hours  losartan 25 milliGRAM(s) Oral daily  metoprolol tartrate 12.5 milliGRAM(s) Oral every 12 hours  pantoprazole    Tablet 40 milliGRAM(s) Oral before breakfast    MEDICATIONS  (PRN):  acetaminophen     Tablet .. 650 milliGRAM(s) Oral every 6 hours PRN Temp greater or equal to 38C (100.4F), Mild Pain (1 - 3)  aluminum hydroxide/magnesium hydroxide/simethicone Suspension 30 milliLiter(s) Oral every 4 hours PRN Dyspepsia  HYDROmorphone  Injectable 1 milliGRAM(s) IV Push every 6 hours PRN Severe Pain (7 - 10)  melatonin 3 milliGRAM(s) Oral at bedtime PRN Insomnia  ondansetron Injectable 4 milliGRAM(s) IV Push every 8 hours PRN Nausea and/or Vomiting  oxycodone    5 mG/acetaminophen 325 mG 1 Tablet(s) Oral every 6 hours PRN Moderate Pain (4 - 6)  oxycodone    5 mG/acetaminophen 325 mG 2 Tablet(s) Oral every 4 hours PRN Mild Pain (1 - 3)      LAB/STUDIES:                        14.0   10.94 )-----------( 191      ( 21 Jul 2023 08:38 )             44.8     07-21    139  |  96<L>  |  13  ----------------------------<  198<H>  4.3   |  36<H>  |  0.8    Ca    8.6      21 Jul 2023 08:38  Mg     2.2     07-21    TPro  6.4  /  Alb  3.5  /  TBili  0.4  /  DBili  x   /  AST  16  /  ALT  15  /  AlkPhos  83  07-21    PT/INR - ( 21 Jul 2023 08:38 )   PT: 12.10 sec;   INR: 1.06 ratio         PTT - ( 21 Jul 2023 09:00 )  PTT:41.1 sec  LIVER FUNCTIONS - ( 21 Jul 2023 08:38 )  Alb: 3.5 g/dL / Pro: 6.4 g/dL / ALK PHOS: 83 U/L / ALT: 15 U/L / AST: 16 U/L / GGT: x             Urinalysis Basic - ( 21 Jul 2023 08:38 )    Color: x / Appearance: x / SG: x / pH: x  Gluc: 198 mg/dL / Ketone: x  / Bili: x / Urobili: x   Blood: x / Protein: x / Nitrite: x   Leuk Esterase: x / RBC: x / WBC x   Sq Epi: x / Non Sq Epi: x / Bacteria: x                    IMAGING:      ASSESSMENT: Patient is a  old  with ****  Vascular surgery consulted for    PLAN:   -   -   -   -   - Patient seen/examined or Plan Discussed with Fellow,    - Plan to be discussed with Attending, Dr. LYLE [id number]   VASCULAR SURGERY CONSULT NOTE      HPI:  52 yo m hx of copd (On 2.5L/min O2 PRN at home), CAD (hx of MI in April 2021), LAITH (On CPAP), HLD and obesity, c/o pain with redness and swelling to LLE, History goes back to 1 week prior to presentation when patient complained of Left foot pain and swelling which resolved after massaging the foot. The second day, the swelling and the pain extended to the calf and then to the upper thigh 3 days later. Patient declines any recent scratch, insect bite, or any new lesion before symptoms began, He has no fever, no chills, no chest pain, no diarrhea, no new cough. He has no recent travel, no recent antibiotics uses.    Vascular was consulted for Mr Freddy d/t LLE DVT. Patient was seen and examined at bedside. Exam was notable severe edema, erythema and pain. Extremities were warm and no pallor present. Pain most notable on posterior calf. Patient had intact sensation and motor function. Following pulses were dopplerable; L POP and PT. DP not palp or dop.     ED vitals were within normal range, no sings of sepsis    Labs are significant for wbc of 13.8    CT lower extremity: Subcutaneous fat infiltration and edema of the entire left lower extremity, correlated to cellulitis. No evidence of necrotizing fasciitis.Trace knee joint effusion (20 Jul 2023 08:46)        PAST MEDICAL & SURGICAL HISTORY:  COPD (chronic obstructive pulmonary disease)      Anxiety      CAD (coronary artery disease)      LAITH on CPAP      HLD (hyperlipidemia)      History of knee replacement      History of cholecystectomy      S/P flap graft        penicillins (Other)    Home Medications:  aspirin 81 mg oral capsule: 1 orally once a day (20 Jul 2023 09:00)  atorvastatin 10 mg oral tablet: 1 tab(s) orally once a day (20 Jul 2023 09:05)  DuoNeb 0.5 mg-2.5 mg/3 mL inhalation solution: 3 milliliter(s) inhaled 4 times a day, As Needed (21 May 2018 19:47)  Effer-K 10 mEq oral tablet, effervescent: 1 tab(s) orally once a day (20 Jul 2023 09:05)  furosemide 40 mg oral tablet: 1 tab(s) orally once a day (20 Jul 2023 09:05)  losartan 25 mg oral tablet: 1 tab(s) orally once a day (20 Jul 2023 09:05)  Metoprolol Tartrate 25 mg oral tablet: 1.5 tab(s) orally once a day (20 Jul 2023 09:05)  Ventolin 90 mcg/inh inhalation aerosol: 1 puff(s) inhaled 4 times a day as needed for  shortness of breath and/or wheezing (20 Jul 2023 09:05)    No permtinent family history of PVD    REVIEW OF SYSTEMS:  GENERAL:                                         negative  SKIN:                                                 negative  OPTHALMOLOGIC:                          negative  ENMT:                                               negative  RESPIRATORY AND THORAX:        negative  CARDIOVASCULAR:                         negative  GASTROINTESTINAL:                       negative  NEPHROLOGY:                                  negative  MUSCULOSKELETAL:                       negative  NEUROLOGIC:                                   negative  PSYCHIATRIC:                                    negative  HEMATOLOGY/LYMPHATICS:         negative  ENDOCRINE:                                     negative  ALLERGIC/IMMUNOLOGIC:            negative    12 point ROS otherwise normal except as stated in HPI    PHYSICAL EXAM  Vital Signs Last 24 Hrs  T(C): 36.7 (21 Jul 2023 05:00), Max: 36.7 (21 Jul 2023 05:00)  T(F): 98.1 (21 Jul 2023 05:00), Max: 98.1 (21 Jul 2023 05:00)  HR: 82 (21 Jul 2023 05:00) (79 - 82)  BP: 120/58 (21 Jul 2023 05:00) (120/58 - 141/62)  BP(mean): --  RR: 18 (21 Jul 2023 05:00) (17 - 18)  SpO2: 98% (21 Jul 2023 05:00) (97% - 98%)    Parameters below as of 21 Jul 2023 05:00  Patient On (Oxygen Delivery Method): nasal cannula        Appearance: Normal	  HEENT:   Normal oral mucosa, PERRL, EOMI	  Neck: Supple, - JVD; Carotid Bruit   Cardiovascular: Normal S1 S2, No JVD, No murmurs,   Respiratory: Lungs clear to auscultation, No Rales, Rhonchi, Wheezing	  Gastrointestinal:  Soft, Non-tender, positive BS	  Skin: No rashes, No ecchymoses, No cyanosis  Extremities: LLE significant edema and erythema. Pain greatest posterior LE. Dop PT/POP  Vascular: Peripheral pulses palpable 2+ bilaterally  Neurologic: Non-focal  Psychiatry: A & O x 3, Mood & affect appropriate        MEDICATIONS:   MEDICATIONS  (STANDING):  albuterol/ipratropium for Nebulization 3 milliLiter(s) Nebulizer every 6 hours  apixaban 10 milliGRAM(s) Oral every 12 hours  aspirin 81 milliGRAM(s) Oral daily  atorvastatin 10 milliGRAM(s) Oral at bedtime  aztreonam  IVPB 2000 milliGRAM(s) IV Intermittent every 8 hours  buDESOnide    Inhalation Suspension 0.5 milliGRAM(s) Inhalation two times a day  cefTRIAXone   IVPB 2000 milliGRAM(s) IV Intermittent every 12 hours  furosemide   Injectable 40 milliGRAM(s) IV Push daily  linezolid  IVPB 600 milliGRAM(s) IV Intermittent every 12 hours  losartan 25 milliGRAM(s) Oral daily  metoprolol tartrate 12.5 milliGRAM(s) Oral every 12 hours  pantoprazole    Tablet 40 milliGRAM(s) Oral before breakfast    MEDICATIONS  (PRN):  acetaminophen     Tablet .. 650 milliGRAM(s) Oral every 6 hours PRN Temp greater or equal to 38C (100.4F), Mild Pain (1 - 3)  aluminum hydroxide/magnesium hydroxide/simethicone Suspension 30 milliLiter(s) Oral every 4 hours PRN Dyspepsia  HYDROmorphone  Injectable 1 milliGRAM(s) IV Push every 6 hours PRN Severe Pain (7 - 10)  melatonin 3 milliGRAM(s) Oral at bedtime PRN Insomnia  ondansetron Injectable 4 milliGRAM(s) IV Push every 8 hours PRN Nausea and/or Vomiting  oxycodone    5 mG/acetaminophen 325 mG 1 Tablet(s) Oral every 6 hours PRN Moderate Pain (4 - 6)  oxycodone    5 mG/acetaminophen 325 mG 2 Tablet(s) Oral every 4 hours PRN Mild Pain (1 - 3)      LAB/STUDIES:                        14.0   10.94 )-----------( 191      ( 21 Jul 2023 08:38 )             44.8     07-21    139  |  96<L>  |  13  ----------------------------<  198<H>  4.3   |  36<H>  |  0.8    Ca    8.6      21 Jul 2023 08:38  Mg     2.2     07-21    TPro  6.4  /  Alb  3.5  /  TBili  0.4  /  DBili  x   /  AST  16  /  ALT  15  /  AlkPhos  83  07-21    PT/INR - ( 21 Jul 2023 08:38 )   PT: 12.10 sec;   INR: 1.06 ratio         PTT - ( 21 Jul 2023 09:00 )  PTT:41.1 sec  LIVER FUNCTIONS - ( 21 Jul 2023 08:38 )  Alb: 3.5 g/dL / Pro: 6.4 g/dL / ALK PHOS: 83 U/L / ALT: 15 U/L / AST: 16 U/L / GGT: x             Urinalysis Basic - ( 21 Jul 2023 08:38 )    Color: x / Appearance: x / SG: x / pH: x  Gluc: 198 mg/dL / Ketone: x  / Bili: x / Urobili: x   Blood: x / Protein: x / Nitrite: x   Leuk Esterase: x / RBC: x / WBC x   Sq Epi: x / Non Sq Epi: x / Bacteria: x      IMAGING:      ASSESSMENT: Patient is a 52 yo m hx of copd (On 2.5L/min O2 PRN at home), CAD (hx of MI in April 2021), LAITH (On CPAP), HLD and obesity and superficial clot LLE (was told to take ASA) c/o pain with redness and swelling to LLE x 1 week.  Vascular surgery consulted for DVT and cellulitis of LLE.     PLAN:   - ACE wrap/compression and elevation  - Outpatient f/u 2 weeks   - Eliquis 10 BID x 7 day followed by eliquis 5 BID indef until f/u appt  - Patient is high risk for thrombectomy d/t pulmonary comorbidities   - Call vascular surgery PRN     SPECTRA [id number]

## 2023-07-21 NOTE — PROGRESS NOTE ADULT - SUBJECTIVE AND OBJECTIVE BOX
SUBJECTIVE:    Patient is a 53y old Male who presents with a chief complaint of Left lower limb swelling and redness (20 Jul 2023 08:46)     Today is hospital day 1d. Overnight, the night team stopped heparin for 1 hr due to increased aPTT. This morning, patient complained of increasing pain in LLE from foot to thigh. LLE red warm swollen, skin tight and shiny. Pulses subtle, no numbness or tingling. No associated urinary symptoms. No fever, chills, nausea, headache. Patient reported 2 yrs ago had a similar episode on the left shin but resolved on PO aspirin.    Satting 98 on 2L NC. No complaint of CP or SOB.    PAST MEDICAL & SURGICAL HISTORY  COPD (chronic obstructive pulmonary disease)    Anxiety    CAD (coronary artery disease)    LAITH on CPAP    HLD (hyperlipidemia)    History of knee replacement    History of cholecystectomy    S/P flap graft      SOCIAL HISTORY:  Negative for smoking/alcohol/drug use.     ALLERGIES:  penicillins (Other)    MEDICATIONS:  STANDING MEDICATIONS  albuterol/ipratropium for Nebulization 3 milliLiter(s) Nebulizer every 6 hours  aspirin 81 milliGRAM(s) Oral daily  atorvastatin 10 milliGRAM(s) Oral at bedtime  buDESOnide    Inhalation Suspension 0.5 milliGRAM(s) Inhalation two times a day  heparin  Infusion.  Unit(s)/Hr IV Continuous <Continuous>  metoprolol tartrate 12.5 milliGRAM(s) Oral every 12 hours  pantoprazole    Tablet 40 milliGRAM(s) Oral before breakfast  vancomycin  IVPB 2000 milliGRAM(s) IV Intermittent every 12 hours    PRN MEDICATIONS  acetaminophen     Tablet .. 650 milliGRAM(s) Oral every 6 hours PRN  aluminum hydroxide/magnesium hydroxide/simethicone Suspension 30 milliLiter(s) Oral every 4 hours PRN  heparin   Injectable 74542 Unit(s) IV Push every 6 hours PRN  heparin   Injectable 5000 Unit(s) IV Push every 6 hours PRN  melatonin 3 milliGRAM(s) Oral at bedtime PRN  ondansetron Injectable 4 milliGRAM(s) IV Push every 8 hours PRN  oxycodone    5 mG/acetaminophen 325 mG 1 Tablet(s) Oral every 6 hours PRN    VITALS:   T(F): 98.1  HR: 82  BP: 120/58  RR: 18  SpO2: 98%    LABS:                        14.4   11.68 )-----------( 172      ( 20 Jul 2023 22:14 )             44.2     07-20    137  |  92<L>  |  19  ----------------------------<  181<H>  3.9   |  36<H>  |  1.0    Ca    8.6      20 Jul 2023 04:20    TPro  6.9  /  Alb  3.8  /  TBili  0.4  /  DBili  x   /  AST  16  /  ALT  17  /  AlkPhos  91  07-20    PT/INR - ( 20 Jul 2023 04:20 )   PT: 12.00 sec;   INR: 1.05 ratio         PTT - ( 20 Jul 2023 22:14 )  PTT:175.0 sec  Urinalysis Basic - ( 20 Jul 2023 04:20 )    Color: x / Appearance: x / SG: x / pH: x  Gluc: 181 mg/dL / Ketone: x  / Bili: x / Urobili: x   Blood: x / Protein: x / Nitrite: x   Leuk Esterase: x / RBC: x / WBC x   Sq Epi: x / Non Sq Epi: x / Bacteria: x        RADIOLOGY:    PHYSICAL EXAM:  GEN: No acute distress  LUNGS: Clear to auscultation bilaterally   HEART: Regular  ABD: Soft, non-tender, non-distended.  EXT: LLE red warm swollen, skin tight and shiny  NEURO: AAOX3

## 2023-07-21 NOTE — CONSULT NOTE ADULT - ASSESSMENT
52 yo m hx of copd (On 2.5L/min O2 PRN at home), CAD (hx of MI in April 2021), LAITH (On CPAP), HLD and obesity, c/o pain with redness and swelling to LLE, History goes back to 1 week prior to presentation when patient complained of Left foot pain and swelling which resolved after massaging the foot. The second day, the swelling and the pain extended to the calf and then to the upper thigh 3 days later. Patient declines any recent scratch, insect bite, or any new lesion before symptoms began, He has no fever, no chills, no chest pain, no diarrhea, no new cough. He has no recent travel, no recent antibiotics uses.      IMPRESSION/RECOMMENDATIONS   < from: [facility] Dup[facility] Lower Ext Vein Scan, Bilat (07.20.23 @ 10:07) >  Acute appearing deep venous thrombosis of the left common femoral,   femoral and popliteal veins. Superficial thrombophlebitis of the left   greater saphenous vein.    < end of copied text >    7/20 CT lower extremity: Subcutaneous fat infiltration and edema of the entire left lower extremity, correlated to cellulitis. No evidence of necrotizing fasciitis. Trace knee joint effusion     Major pathology does not seem infectious rather is secondary to acute venous thrombosis of the L CFV/L Popliteal vein with extensive surrounding inflammation  No fascitis/pyomyositis/abscess.  No compartment syndrome  WBC 11.6  -stat Vascular Sx evaluation  -Linezolid 600 mg iv q12h  -Unasyn 3 gm iv q6h 54 yo m hx of copd (On 2.5L/min O2 PRN at home), CAD (hx of MI in April 2021), LAITH (On CPAP), HLD and obesity, c/o pain with redness and swelling to LLE, History goes back to 1 week prior to presentation when patient complained of Left foot pain and swelling which resolved after massaging the foot. The second day, the swelling and the pain extended to the calf and then to the upper thigh 3 days later. Patient declines any recent scratch, insect bite, or any new lesion before symptoms began, He has no fever, no chills, no chest pain, no diarrhea, no new cough. He has no recent travel, no recent antibiotics uses.      IMPRESSION/RECOMMENDATIONS   < from: [facility] Duplex Lower Ext Vein Scan, Bilat (07.20.23 @ 10:07) >  Acute appearing deep venous thrombosis of the left common femoral,   femoral and popliteal veins. Superficial thrombophlebitis of the left   greater saphenous vein.    < end of copied text >    7/20 CT lower extremity: Subcutaneous fat infiltration and edema of the entire left lower extremity, correlated to cellulitis. No evidence of necrotizing fasciitis. Trace knee joint effusion     Major pathology does not seem infectious rather is secondary to acute venous thrombosis of the L CFV/L Popliteal vein with extensive surrounding inflammation  No fascitis/pyomyositis/abscess.  No compartment syndrome  WBC 11.6  -stat Vascular Sx evaluation  -Linezolid 600 mg iv q12h  -Rocephin 2 gm iv q12h

## 2023-07-21 NOTE — CONSULT NOTE ADULT - ADDITIONAL PE
LLE : significant edema ( especially the thigh ) , induration/erythema /tender to palpation, no crepitans, no drainage/fluctuans. No distal pulses

## 2023-07-21 NOTE — MEDICAL STUDENT PROGRESS NOTE(EDUCATION) - ASSESSMENT
#Cellulitis  #LLE Pain  - LLE edema extending from foot to upper thigh. Leg is tender to touch, warm and erythematous.   - Vancomycin 2000mg IV due to Penicillin allergy   - c/w Tylenol  - No fevers or chills. No recent injuries other than cut in LUE from a nail   - CT: Subq fat infiltration and edema of LLE. No Nec fas  - Duplex: DVT of L common femoral, femoral, popliteal. Superficial thrombophlebitis of L Greater Saphenous vein   - order blood culture     #COPD/LAITH  - O2 at home 2.5L/min (3L/min after exacerbation)   - CPAP    #CAD  - c/w Aspitin and Metoprolol     #Obesity  - Pt recently gained 55lb w/in a month   - his cardiologist put him on lasix     #HLD  - C/w Atorvastatin   #Cellulitis  #LLE Pain  #DVT  - LLE edema extending from foot to upper thigh. Leg is tender to touch, warm and erythematous.   - c/w Tylenol  - No fevers or chills. No recent injuries other than cut in LUE from a nail   - CT: Subq fat infiltration and edema of LLE. No Nec fas  - Duplex: DVT of L common femoral, femoral, popliteal. Superficial thrombophlebitis of L Greater Saphenous vein   - d/c Vanco  - Order nasal swab   - Contact vascular Sx for thrombectomy otherwise start Eliquis   - Order PTT  - Percocet 1mg every 6 hr (severe pain)  - Oxy 10mg every 4 hr PRN (mild pain)    #COPD/LAITH  - O2 at home 2.5L/min (3L/min after exacerbation)   - CPAP    #CAD  - c/w Aspitin and Metoprolol   - Order echo  - CHF protocol    #Obesity  - Pt recently gained 55lb w/in a month   - his cardiologist put him on lasix     #HLD  - C/w Atorvastatin

## 2023-07-21 NOTE — PHYSICAL THERAPY INITIAL EVALUATION ADULT - NSACTIVITYREC_GEN_A_PT
Patient educated on heel slides x30 with L LE ( to address ROM) and encouraged to do daily to improve sit<->stand transfers and ambulation.

## 2023-07-21 NOTE — PROGRESS NOTE ADULT - ATTENDING COMMENTS
Pt was seen and examined at bedside independently, pt is c/o left leg swelling and pain, was admitted for acute DVT and LLE cellulitis.   He is morbidly obese with sedentary lifestyle, has acute on chronic diastolic CHF, started on IV Lasix.   I agree with medical resident's assessment and plan above with a few corrections in my note.       A/P   52 yo m hx of copd (On 2.5L/min O2 PRN at home), CAD (hx of MI in April 2021), LAITH (On CPAP), HLD and obesity, presented for cellulitis, no signs of sepsis, was found to have acute DVT, LLE cellulitis and acute CHF.     # LLE Cellulitis  - CT: Cellulitis, no necrotizing fascitis  -No fascitis/pyomyositis/abscess or  compartment syndrome  - WBC 11.6  - stat Vascular Sx evaluation  -Linezolid 600 mg iv q12h  -Rocephin 2 gm iv q12h  - get nasal swab for MRSA  - keep LE elevated     #LLE pain 2/2 Extensive DVT   - consulted by vascular surgery, no intervention recommended   - started on Eliquis 10 mg BID   - ACE wrap to LE, elevated LE   - DVT likely provoked     # Acute on chronic suspected diastolic CHF/ LAITH/ morbid obesity/ OHS  - fluid restriction 1200 ml in 24 hours  - intake and output monitoring, daily weight   - start IV Lasix, keep negative balance  - get TTE  - encourage compliance with CPAP     # h/o CAD/ MI  - on ASA, statin, BB   - OP f/u with Dr Seaman     # GI prophylaxis    Case d/w PMD DR Garcia, will keep  pt on our service

## 2023-07-21 NOTE — PROGRESS NOTE ADULT - ASSESSMENT
· Assessment	  54 yo m hx of copd (On 2.5L/min O2 PRN at home), CAD (hx of MI in April 2021), LAITH (On CPAP), HLD and obesity, presented for cellulitis, no signs of sepsis    #Cellulitis, LLE  - Left lower limb non-pitting edema, redness and hotness extending from the foot to the upper thigh  - No fever, no chills  - WBC: 13.8--11.68  - CT 7/20: Cellulitis, no necrotizing fascitis, trace knee joint effusion  - Started on Vancomycin (due to hx of anaphylaxis to Penicillin)  - ID consulted, pending eval  - Pending lower limbs duplex     #LLE pain  - Continue Tylenol standing  - Pending lower limbs duplex       #COPD/LAITH on home O2  - B/L wheezes  - at home on 2.5-3L O2, now satting 98 on 2L NC  - Continue DuoNeb   - Pending CXR    #CAD  - Continue Aspirin and Metoprolol    #Obesity  - baseline 350 pd  - stated gained 40 pounds within a short time, cardiologist started him on PO lasix, weight approaching baseline    #HLD  - Continue Atorvastatin    Misc:  DVT ppx: Enoxaparin 40mf SC daily  GI ppx: Pantoprazol 40 mg PO daily  Diet: Regular, low fat  Activity: As tolerated, PT ordered  Code: Full Code  Dispo:   · Assessment	  52 yo m hx of copd (On 2.5L/min O2 PRN at home), CAD (hx of MI in April 2021), LAITH (On CPAP), HLD and obesity, presented for cellulitis, no signs of sepsis    #DVT  #Cellulitis, LLE  - LL duplex 7/20 showed "Acute appearing deep venous thrombosis of the left common femoral, femoral and popliteal veins. Superficial thrombophlebitis of the left greater saphenous vein"  - Left lower limb non-pitting edema, redness and hotness extending from the foot to the upper thigh  - No fever, no chills, WBC: 13.8--11.68  - CT 7/20: Cellulitis, no necrotizing fascitis, trace knee joint effusion  - in the ED, started on Vancomycin  - ID consulted, pending eval  - Vascular surgery consulted regarding thrombectomy      #COPD/LAITH on home O2  - B/L wheezes  - at home on 2.5-3L O2, now satting 98 on 2L NC  - Continue DuoNeb   - Pending CXR    #CAD  - Continue Aspirin and Metoprolol    #Obesity  #CHF  - baseline 350 pd, uses CPAP at home  - stated gained 40 pounds within a short time, cardiologist started him on PO lasix, weight approaching baseline  - start 40mg iv lasix  - strict uo, monitor BUN/Cr  - fluid restriction    #HLD  - Continue Atorvastatin    Misc:  DVT ppx: Enoxaparin 40mf SC daily  GI ppx: Pantoprazol 40 mg PO daily  Diet: Regular, low fat  Activity: As tolerated, PT ordered  Code: Full Code  Dispo:   · Assessment	  52 yo m hx of copd (On 2.5L/min O2 PRN at home), CAD (hx of MI in April 2021), LAITH (On CPAP), HLD and obesity, presented for cellulitis, no signs of sepsis    #DVT  #Cellulitis, LLE  - LL duplex 7/20 showed "Acute appearing deep venous thrombosis of the left common femoral, femoral and popliteal veins. Superficial thrombophlebitis of the left greater saphenous vein"  - Left lower limb non-pitting edema, redness and hotness extending from the foot to the upper thigh  - No fever, no chills, WBC: 13.8--11.68  - CT 7/20: Cellulitis, no necrotizing fascitis, trace knee joint effusion  - ID consulted, pending eval  - s/p Vancomycin in the ED  - 7/21: start on aztreonam  - Vascular surgery consulted regarding thrombectomy      #COPD/LAITH on home O2  - B/L wheezes  - at home on 2.5-3L O2, now satting 98 on 2L NC  - Continue DuoNeb   - Pending CXR    #CAD  - Continue Aspirin and Metoprolol    #Obesity  #CHF  - baseline 350 pd, uses CPAP at home  - stated gained 40 pounds within a short time, cardiologist started him on PO lasix, weight approaching baseline  - start 40mg iv lasix  - strict uo, monitor BUN/Cr  - fluid restriction    #HLD  - Continue Atorvastatin    Misc:  DVT ppx: Enoxaparin 40mf SC daily  GI ppx: Pantoprazol 40 mg PO daily  Diet: Regular, low fat  Activity: As tolerated, PT ordered  Code: Full Code  Dispo:   · Assessment	  54 yo m hx of copd (On 2.5L/min O2 PRN at home), CAD (hx of MI in April 2021), LAITH (On CPAP), HLD and obesity, presented for cellulitis, no signs of sepsis    #DVT  #Cellulitis, LLE  #LLE pain  - LL duplex 7/20 showed "Acute appearing deep venous thrombosis of the left common femoral, femoral and popliteal veins. Superficial thrombophlebitis of the left greater saphenous vein"  - Left lower limb non-pitting edema, redness and hotness extending from the foot to the upper thigh  - No fever, no chills, WBC: 13.8--11.68  - CT 7/20: Cellulitis, no necrotizing fascitis, trace knee joint effusion  - ID consulted, pending eval  - s/p Vancomycin in the ED  - 7/21: per ID, start on IV linezolid and rocephin  - Vascular surgery consulted regarding thrombectomy  - pain regimen: oxycodone 10mg po q4hr prn for mild pain, dilaudid 1mg IV q6h prn for severe pain      #COPD/LAITH on home O2  - B/L wheezes  - at home on 2.5-3L O2, now satting 98 on 2L NC  - Continue DuoNeb   - Pending CXR    #CAD  - Continue Aspirin and Metoprolol    #Obesity  #CHF  - baseline 350 pd, uses CPAP at home  - stated gained 40 pounds within a short time, cardiologist started him on PO lasix, weight approaching baseline  - start 40mg iv lasix  - strict uo, monitor BUN/Cr  - fluid restriction    #HLD  - Continue Atorvastatin    Misc:  DVT ppx: Enoxaparin 40mf SC daily  GI ppx: Pantoprazol 40 mg PO daily  Diet: Regular, low fat  Activity: As tolerated, PT ordered  Code: Full Code  Dispo:

## 2023-07-21 NOTE — CONSULT NOTE ADULT - SUBJECTIVE AND OBJECTIVE BOX
SHANKAR HECTOR  53y, Male  Allergy: penicillins (Other)      All historical available data reviewed.    HPI:  54 yo m hx of copd (On 2.5L/min O2 PRN at home), CAD (hx of MI in April 2021), LAITH (On CPAP), HLD and obesity, c/o pain with redness and swelling to LLE, History goes back to 1 week prior to presentation when patient complained of Left foot pain and swelling which resolved after massaging the foot. The second day, the swelling and the pain extended to the calf and then to the upper thigh 3 days later. Patient declines any recent scratch, insect bite, or any new lesion before symptoms began, He has no fever, no chills, no chest pain, no diarrhea, no new cough. He has no recent travel, no recent antibiotics uses.    ED vitals were within normal range, no sings of sepsis    Labs are significant for wbc of 13.8    CT lower extremity: Subcutaneous fat infiltration and edema of the entire left lower extremity, correlated to cellulitis. No evidence of necrotizing fasciitis.Trace knee joint effusion (20 Jul 2023 08:46)    FAMILY HISTORY:  No pertinent family history in first degree relatives      PAST MEDICAL & SURGICAL HISTORY:  COPD (chronic obstructive pulmonary disease)      Anxiety      CAD (coronary artery disease)      LAITH on CPAP      HLD (hyperlipidemia)      History of knee replacement      History of cholecystectomy      S/P flap graft            VITALS:  T(F): 98.1, Max: 98.1 (07-21-23 @ 05:00)  HR: 82  BP: 120/58  RR: 18Vital Signs Last 24 Hrs  T(C): 36.7 (21 Jul 2023 05:00), Max: 36.7 (21 Jul 2023 05:00)  T(F): 98.1 (21 Jul 2023 05:00), Max: 98.1 (21 Jul 2023 05:00)  HR: 82 (21 Jul 2023 05:00) (79 - 82)  BP: 120/58 (21 Jul 2023 05:00) (120/58 - 141/62)  BP(mean): --  RR: 18 (21 Jul 2023 05:00) (17 - 18)  SpO2: 98% (21 Jul 2023 05:00) (97% - 98%)    Parameters below as of 21 Jul 2023 05:00  Patient On (Oxygen Delivery Method): nasal cannula        TESTS & MEASUREMENTS:                        14.0   10.94 )-----------( 191      ( 21 Jul 2023 08:38 )             44.8     07-21    139  |  96<L>  |  13  ----------------------------<  198<H>  4.3   |  36<H>  |  0.8    Ca    8.6      21 Jul 2023 08:38  Mg     2.2     07-21    TPro  6.4  /  Alb  3.5  /  TBili  0.4  /  DBili  x   /  AST  16  /  ALT  15  /  AlkPhos  83  07-21    LIVER FUNCTIONS - ( 21 Jul 2023 08:38 )  Alb: 3.5 g/dL / Pro: 6.4 g/dL / ALK PHOS: 83 U/L / ALT: 15 U/L / AST: 16 U/L / GGT: x             Urinalysis Basic - ( 21 Jul 2023 08:38 )    Color: x / Appearance: x / SG: x / pH: x  Gluc: 198 mg/dL / Ketone: x  / Bili: x / Urobili: x   Blood: x / Protein: x / Nitrite: x   Leuk Esterase: x / RBC: x / WBC x   Sq Epi: x / Non Sq Epi: x / Bacteria: x          RADIOLOGY & ADDITIONAL TESTS:  Personal review of radiological diagnostics performed  Echo and EKG results noted when applicable.     MEDICATIONS:  acetaminophen     Tablet .. 650 milliGRAM(s) Oral every 6 hours PRN  albuterol/ipratropium for Nebulization 3 milliLiter(s) Nebulizer every 6 hours  aluminum hydroxide/magnesium hydroxide/simethicone Suspension 30 milliLiter(s) Oral every 4 hours PRN  aspirin 81 milliGRAM(s) Oral daily  atorvastatin 10 milliGRAM(s) Oral at bedtime  aztreonam  IVPB 2000 milliGRAM(s) IV Intermittent every 8 hours  buDESOnide    Inhalation Suspension 0.5 milliGRAM(s) Inhalation two times a day  furosemide   Injectable 40 milliGRAM(s) IV Push daily  heparin   Injectable 90376 Unit(s) IV Push every 6 hours PRN  heparin   Injectable 5000 Unit(s) IV Push every 6 hours PRN  heparin  Infusion.  Unit(s)/Hr IV Continuous <Continuous>  HYDROmorphone  Injectable 1 milliGRAM(s) IV Push every 6 hours PRN  losartan 25 milliGRAM(s) Oral daily  melatonin 3 milliGRAM(s) Oral at bedtime PRN  metoprolol tartrate 12.5 milliGRAM(s) Oral every 12 hours  ondansetron Injectable 4 milliGRAM(s) IV Push every 8 hours PRN  oxycodone    5 mG/acetaminophen 325 mG 1 Tablet(s) Oral every 6 hours PRN  oxycodone    5 mG/acetaminophen 325 mG 2 Tablet(s) Oral every 4 hours PRN  pantoprazole    Tablet 40 milliGRAM(s) Oral before breakfast      ANTIBIOTICS:  aztreonam  IVPB 2000 milliGRAM(s) IV Intermittent every 8 hours

## 2023-07-21 NOTE — PHYSICAL THERAPY INITIAL EVALUATION ADULT - GENERAL OBSERVATIONS, REHAB EVAL
Patient reported he ambulates regularly to and from bathroom with no AD. PT IE 2:15-2:45pm. Patient left in supine in NAD. +call bell, +bed alarm, +IV, +bandaging at L LE (cellulitis), +O2 2-3L (baseline home O2 PRN).

## 2023-07-22 LAB
ALBUMIN SERPL ELPH-MCNC: 3.6 G/DL — SIGNIFICANT CHANGE UP (ref 3.5–5.2)
ALP SERPL-CCNC: 95 U/L — SIGNIFICANT CHANGE UP (ref 30–115)
ALT FLD-CCNC: 30 U/L — SIGNIFICANT CHANGE UP (ref 0–41)
ANION GAP SERPL CALC-SCNC: 10 MMOL/L — SIGNIFICANT CHANGE UP (ref 7–14)
AST SERPL-CCNC: 29 U/L — SIGNIFICANT CHANGE UP (ref 0–41)
BASOPHILS # BLD AUTO: 0.04 K/UL — SIGNIFICANT CHANGE UP (ref 0–0.2)
BASOPHILS NFR BLD AUTO: 0.3 % — SIGNIFICANT CHANGE UP (ref 0–1)
BILIRUB SERPL-MCNC: 0.6 MG/DL — SIGNIFICANT CHANGE UP (ref 0.2–1.2)
BUN SERPL-MCNC: 12 MG/DL — SIGNIFICANT CHANGE UP (ref 10–20)
CALCIUM SERPL-MCNC: 8.7 MG/DL — SIGNIFICANT CHANGE UP (ref 8.4–10.5)
CHLORIDE SERPL-SCNC: 91 MMOL/L — LOW (ref 98–110)
CO2 SERPL-SCNC: 38 MMOL/L — HIGH (ref 17–32)
CREAT SERPL-MCNC: 1 MG/DL — SIGNIFICANT CHANGE UP (ref 0.7–1.5)
EGFR: 89 ML/MIN/1.73M2 — SIGNIFICANT CHANGE UP
EOSINOPHIL # BLD AUTO: 0.35 K/UL — SIGNIFICANT CHANGE UP (ref 0–0.7)
EOSINOPHIL NFR BLD AUTO: 3 % — SIGNIFICANT CHANGE UP (ref 0–8)
GLUCOSE SERPL-MCNC: 160 MG/DL — HIGH (ref 70–99)
HCT VFR BLD CALC: 46.6 % — SIGNIFICANT CHANGE UP (ref 42–52)
HGB BLD-MCNC: 14.8 G/DL — SIGNIFICANT CHANGE UP (ref 14–18)
IMM GRANULOCYTES NFR BLD AUTO: 0.3 % — SIGNIFICANT CHANGE UP (ref 0.1–0.3)
LYMPHOCYTES # BLD AUTO: 1.48 K/UL — SIGNIFICANT CHANGE UP (ref 1.2–3.4)
LYMPHOCYTES # BLD AUTO: 12.7 % — LOW (ref 20.5–51.1)
MAGNESIUM SERPL-MCNC: 2.4 MG/DL — SIGNIFICANT CHANGE UP (ref 1.8–2.4)
MCHC RBC-ENTMCNC: 31 PG — SIGNIFICANT CHANGE UP (ref 27–31)
MCHC RBC-ENTMCNC: 31.8 G/DL — LOW (ref 32–37)
MCV RBC AUTO: 97.5 FL — HIGH (ref 80–94)
MONOCYTES # BLD AUTO: 0.81 K/UL — HIGH (ref 0.1–0.6)
MONOCYTES NFR BLD AUTO: 7 % — SIGNIFICANT CHANGE UP (ref 1.7–9.3)
NEUTROPHILS # BLD AUTO: 8.9 K/UL — HIGH (ref 1.4–6.5)
NEUTROPHILS NFR BLD AUTO: 76.7 % — HIGH (ref 42.2–75.2)
NRBC # BLD: 0 /100 WBCS — SIGNIFICANT CHANGE UP (ref 0–0)
PLATELET # BLD AUTO: 202 K/UL — SIGNIFICANT CHANGE UP (ref 130–400)
PMV BLD: 11 FL — HIGH (ref 7.4–10.4)
POTASSIUM SERPL-MCNC: 4 MMOL/L — SIGNIFICANT CHANGE UP (ref 3.5–5)
POTASSIUM SERPL-SCNC: 4 MMOL/L — SIGNIFICANT CHANGE UP (ref 3.5–5)
PROT SERPL-MCNC: 6.7 G/DL — SIGNIFICANT CHANGE UP (ref 6–8)
RBC # BLD: 4.78 M/UL — SIGNIFICANT CHANGE UP (ref 4.7–6.1)
RBC # FLD: 12.5 % — SIGNIFICANT CHANGE UP (ref 11.5–14.5)
SODIUM SERPL-SCNC: 139 MMOL/L — SIGNIFICANT CHANGE UP (ref 135–146)
WBC # BLD: 11.62 K/UL — HIGH (ref 4.8–10.8)
WBC # FLD AUTO: 11.62 K/UL — HIGH (ref 4.8–10.8)

## 2023-07-22 PROCEDURE — 99233 SBSQ HOSP IP/OBS HIGH 50: CPT

## 2023-07-22 RX ORDER — FUROSEMIDE 40 MG
40 TABLET ORAL EVERY 12 HOURS
Refills: 0 | Status: DISCONTINUED | OUTPATIENT
Start: 2023-07-22 | End: 2023-07-25

## 2023-07-22 RX ADMIN — LINEZOLID 300 MILLIGRAM(S): 600 INJECTION, SOLUTION INTRAVENOUS at 17:15

## 2023-07-22 RX ADMIN — ATORVASTATIN CALCIUM 10 MILLIGRAM(S): 80 TABLET, FILM COATED ORAL at 22:24

## 2023-07-22 RX ADMIN — LOSARTAN POTASSIUM 25 MILLIGRAM(S): 100 TABLET, FILM COATED ORAL at 05:24

## 2023-07-22 RX ADMIN — LINEZOLID 300 MILLIGRAM(S): 600 INJECTION, SOLUTION INTRAVENOUS at 05:23

## 2023-07-22 RX ADMIN — APIXABAN 10 MILLIGRAM(S): 2.5 TABLET, FILM COATED ORAL at 17:15

## 2023-07-22 RX ADMIN — HYDROMORPHONE HYDROCHLORIDE 1 MILLIGRAM(S): 2 INJECTION INTRAMUSCULAR; INTRAVENOUS; SUBCUTANEOUS at 05:43

## 2023-07-22 RX ADMIN — HYDROMORPHONE HYDROCHLORIDE 1 MILLIGRAM(S): 2 INJECTION INTRAMUSCULAR; INTRAVENOUS; SUBCUTANEOUS at 06:04

## 2023-07-22 RX ADMIN — HYDROMORPHONE HYDROCHLORIDE 1 MILLIGRAM(S): 2 INJECTION INTRAMUSCULAR; INTRAVENOUS; SUBCUTANEOUS at 23:07

## 2023-07-22 RX ADMIN — APIXABAN 10 MILLIGRAM(S): 2.5 TABLET, FILM COATED ORAL at 05:25

## 2023-07-22 RX ADMIN — HYDROMORPHONE HYDROCHLORIDE 1 MILLIGRAM(S): 2 INJECTION INTRAMUSCULAR; INTRAVENOUS; SUBCUTANEOUS at 16:32

## 2023-07-22 RX ADMIN — Medication 12.5 MILLIGRAM(S): at 05:24

## 2023-07-22 RX ADMIN — CEFTRIAXONE 100 MILLIGRAM(S): 500 INJECTION, POWDER, FOR SOLUTION INTRAMUSCULAR; INTRAVENOUS at 05:23

## 2023-07-22 RX ADMIN — HYDROMORPHONE HYDROCHLORIDE 1 MILLIGRAM(S): 2 INJECTION INTRAMUSCULAR; INTRAVENOUS; SUBCUTANEOUS at 15:17

## 2023-07-22 RX ADMIN — Medication 40 MILLIGRAM(S): at 17:16

## 2023-07-22 RX ADMIN — Medication 0.5 MILLIGRAM(S): at 22:24

## 2023-07-22 RX ADMIN — Medication 81 MILLIGRAM(S): at 11:25

## 2023-07-22 RX ADMIN — CEFTRIAXONE 100 MILLIGRAM(S): 500 INJECTION, POWDER, FOR SOLUTION INTRAMUSCULAR; INTRAVENOUS at 17:15

## 2023-07-22 RX ADMIN — HYDROMORPHONE HYDROCHLORIDE 1 MILLIGRAM(S): 2 INJECTION INTRAMUSCULAR; INTRAVENOUS; SUBCUTANEOUS at 23:30

## 2023-07-22 RX ADMIN — Medication 3 MILLILITER(S): at 22:01

## 2023-07-22 RX ADMIN — Medication 12.5 MILLIGRAM(S): at 17:16

## 2023-07-22 RX ADMIN — PANTOPRAZOLE SODIUM 40 MILLIGRAM(S): 20 TABLET, DELAYED RELEASE ORAL at 05:24

## 2023-07-22 RX ADMIN — Medication 1 APPLICATION(S): at 17:15

## 2023-07-22 RX ADMIN — Medication 40 MILLIGRAM(S): at 05:31

## 2023-07-22 NOTE — PROGRESS NOTE ADULT - SUBJECTIVE AND OBJECTIVE BOX
53y old Male who presents with a chief complaint of Left lower limb swelling and redness, he was found to have a soft tissue infection in LLE and extensive LLE DVT, consulted by vascular surgery, no intervention recommended, pt was started on Eliquis 10 mg Q 12 hours on 7/21. He was consulted by ID, on broad spectrum ABx now. Pt is morbidly obese, has h/o CHF, LAITH, gain a lot of weight recently, on IV LAsix now, dose was changed today to 40 MG Q 12 hours and BIPAP QHS.  Today pt feels sightly better, has less pain and swelling, unable to keep ACE wrap on due to significant discomfort.      PAST MEDICAL & SURGICAL HISTORY  COPD (chronic obstructive pulmonary disease)    Anxiety    CAD (coronary artery disease)    LAITH on CPAP    HLD (hyperlipidemia)    History of knee replacement    History of cholecystectomy    S/P flap graft      SOCIAL HISTORY:  Negative for smoking/alcohol/drug use.     ALLERGIES:  penicillins (Other)    VITALS:   T(C): 36 (22 Jul 2023 05:08), Max: 36.4 (21 Jul 2023 19:36)  T(F): 96.8 (22 Jul 2023 05:08), Max: 97.6 (21 Jul 2023 19:36)  HR: 84 (22 Jul 2023 05:08) (79 - 84)  BP: 134/61 (22 Jul 2023 05:08) (102/53 - 134/61)  BP(mean): --  RR: 18 (22 Jul 2023 05:08) (18 - 18)    PHYSICAL EXAM:  GEN: No acute distress, morbidly obese, pleasant   NECK: supple, wide neck   LUNGS: distant BS, shallow breathing    HEART: Regular, S1, S1   ABD: obese,  Soft, non-tender, non-distended, BS present   EXT: 3 + edema on LLE edema with a large area of induration and erythema above the left knee all the way up to the groin.   NEURO: AAOX3, moving all extremities, no focal neuro deficit       LABS:                                   14.8   11.62 )-----------( 202      ( 22 Jul 2023 09:16 )             46.6   07-22    139  |  91<L>  |  12  ----------------------------<  160<H>  4.0   |  38<H>  |  1.0    Ca    8.7      22 Jul 2023 09:16  Mg     2.4     07-22    TPro  6.7  /  Alb  3.6  /  TBili  0.6  /  DBili  x   /  AST  29  /  ALT  30  /  AlkPhos  95  07-22    Urinalysis Basic - ( 20 Jul 2023 04:20 )    Color: x / Appearance: x / SG: x / pH: x  Gluc: 181 mg/dL / Ketone: x  / Bili: x / Urobili: x   Blood: x / Protein: x / Nitrite: x   Leuk Esterase: x / RBC: x / WBC x   Sq Epi: x / Non Sq Epi: x / Bacteria: x      RADIOLOGY:    < from: Xray Chest 1 View- PORTABLE-Routine (Xray Chest 1 View- PORTABLE-Routine .) (07.21.23 @ 13:38) >  Impression:    Cardiomegaly with mild CHF.    < end of copied text >  < from: [facility] Duplex Lower Ext Vein Scan, Bilat (07.20.23 @ 10:07) >    Impression:    No evidence of deep venous thrombosis or superficial thrombophlebitis in   the right lower extremity.    Acute appearing deep venous thrombosis of the left common femoral,   femoral and popliteal veins. Superficial thrombophlebitis of the left   greater saphenous vein.    < end of copied text >  < from: CT Lower Extremity w/ IV Cont, Left (07.20.23 @ 05:06) >  IMPRESSION:    1.  No evidence for soft tissue gas, abscess, abnormal differential   enhancement or osteomyelitis is identified.  2.  Subcutaneous fat infiltration and edema of the entire left lower   extremity. Correlate for cellulitis.    MEDICATIONS  (STANDING):  albuterol/ipratropium for Nebulization 3 milliLiter(s) Nebulizer every 6 hours  apixaban 10 milliGRAM(s) Oral every 12 hours  aspirin 81 milliGRAM(s) Oral daily  atorvastatin 10 milliGRAM(s) Oral at bedtime  buDESOnide    Inhalation Suspension 0.5 milliGRAM(s) Inhalation two times a day  cefTRIAXone   IVPB 2000 milliGRAM(s) IV Intermittent every 12 hours  clotrimazole 1% Cream 1 Application(s) Topical every 12 hours  furosemide   Injectable 40 milliGRAM(s) IV Push every 12 hours  linezolid  IVPB 600 milliGRAM(s) IV Intermittent every 12 hours  losartan 25 milliGRAM(s) Oral daily  metoprolol tartrate 12.5 milliGRAM(s) Oral every 12 hours  pantoprazole    Tablet 40 milliGRAM(s) Oral before breakfast    MEDICATIONS  (PRN):  acetaminophen     Tablet .. 650 milliGRAM(s) Oral every 6 hours PRN Temp greater or equal to 38C (100.4F), Mild Pain (1 - 3)  aluminum hydroxide/magnesium hydroxide/simethicone Suspension 30 milliLiter(s) Oral every 4 hours PRN Dyspepsia  HYDROmorphone  Injectable 1 milliGRAM(s) IV Push every 6 hours PRN Severe Pain (7 - 10)  melatonin 3 milliGRAM(s) Oral at bedtime PRN Insomnia  ondansetron Injectable 4 milliGRAM(s) IV Push every 8 hours PRN Nausea and/or Vomiting  oxyCODONE    IR 10 milliGRAM(s) Oral every 4 hours PRN Mild Pain (1 - 3)

## 2023-07-22 NOTE — PROGRESS NOTE ADULT - ASSESSMENT
54 yo m hx of copd (On 2.5L/min O2 PRN at home), CAD (hx of MI in April 2021), LAITH (On CPAP), HLD and obesity, presented for cellulitis, no signs of sepsis, was found to have acute DVT, LLE cellulitis and acute CHF.     A/P   # LLE Cellulitis  - CT: Cellulitis, no necrotizing fascitis  -No fascitis/pyomyositis/abscess or  compartment syndrome  - on Linezolid 600 mg iv q12h and Rocephin 2 gm iv q12h  - f/u nasal swab for MRSA  - keep LE elevated   - start topical antifungal cream to toe nails     #LLE pain 2/2 Extensive DVT   - consulted by vascular surgery, no intervention recommended   - started on Eliquis 10 mg BID   - ACE wrap to LE, elevated LE   - DVT likely provoked     # Acute on chronic suspected diastolic CHF/ LAITH/ morbid obesity/ OHS  - fluid restriction 1200 ml in 24 hours  - intake and output monitoring, daily weight   - increase  IV Lasix 40 mg to Q 12 hours,  keep negative balance  - get TTE  - encourage compliance with BIPAP ( ordered today), pt has no CPAP at the bedside     # h/o CAD/ MI  - on ASA, statin, BB   - OP f/u with Dr Seaman     # GI prophylaxis    #Progress Note Handoff  Pending (specify):  price Eliquis, increase Lasix to BID, keep negative balance, f/u TTE, supportive care, encourage ambulation  Family discussion: I spoke with pt, he agreed with a plan of care   Disposition: Home__x _/SNF___/Other________/Unknown at this time________

## 2023-07-23 LAB
ALBUMIN SERPL ELPH-MCNC: 3.8 G/DL — SIGNIFICANT CHANGE UP (ref 3.5–5.2)
ALP SERPL-CCNC: 102 U/L — SIGNIFICANT CHANGE UP (ref 30–115)
ALT FLD-CCNC: 45 U/L — HIGH (ref 0–41)
ANION GAP SERPL CALC-SCNC: 9 MMOL/L — SIGNIFICANT CHANGE UP (ref 7–14)
AST SERPL-CCNC: 36 U/L — SIGNIFICANT CHANGE UP (ref 0–41)
BASOPHILS # BLD AUTO: 0.05 K/UL — SIGNIFICANT CHANGE UP (ref 0–0.2)
BASOPHILS NFR BLD AUTO: 0.4 % — SIGNIFICANT CHANGE UP (ref 0–1)
BILIRUB SERPL-MCNC: 0.5 MG/DL — SIGNIFICANT CHANGE UP (ref 0.2–1.2)
BUN SERPL-MCNC: 15 MG/DL — SIGNIFICANT CHANGE UP (ref 10–20)
CALCIUM SERPL-MCNC: 9 MG/DL — SIGNIFICANT CHANGE UP (ref 8.4–10.4)
CHLORIDE SERPL-SCNC: 91 MMOL/L — LOW (ref 98–110)
CO2 SERPL-SCNC: 38 MMOL/L — HIGH (ref 17–32)
CREAT SERPL-MCNC: 0.9 MG/DL — SIGNIFICANT CHANGE UP (ref 0.7–1.5)
EGFR: 101 ML/MIN/1.73M2 — SIGNIFICANT CHANGE UP
EOSINOPHIL # BLD AUTO: 0.34 K/UL — SIGNIFICANT CHANGE UP (ref 0–0.7)
EOSINOPHIL NFR BLD AUTO: 2.9 % — SIGNIFICANT CHANGE UP (ref 0–8)
GLUCOSE SERPL-MCNC: 189 MG/DL — HIGH (ref 70–99)
HCT VFR BLD CALC: 46.3 % — SIGNIFICANT CHANGE UP (ref 42–52)
HGB BLD-MCNC: 15.1 G/DL — SIGNIFICANT CHANGE UP (ref 14–18)
IMM GRANULOCYTES NFR BLD AUTO: 0.5 % — HIGH (ref 0.1–0.3)
LYMPHOCYTES # BLD AUTO: 1.49 K/UL — SIGNIFICANT CHANGE UP (ref 1.2–3.4)
LYMPHOCYTES # BLD AUTO: 12.9 % — LOW (ref 20.5–51.1)
MAGNESIUM SERPL-MCNC: 2 MG/DL — SIGNIFICANT CHANGE UP (ref 1.8–2.4)
MCHC RBC-ENTMCNC: 31.6 PG — HIGH (ref 27–31)
MCHC RBC-ENTMCNC: 32.6 G/DL — SIGNIFICANT CHANGE UP (ref 32–37)
MCV RBC AUTO: 96.9 FL — HIGH (ref 80–94)
MONOCYTES # BLD AUTO: 0.75 K/UL — HIGH (ref 0.1–0.6)
MONOCYTES NFR BLD AUTO: 6.5 % — SIGNIFICANT CHANGE UP (ref 1.7–9.3)
NEUTROPHILS # BLD AUTO: 8.87 K/UL — HIGH (ref 1.4–6.5)
NEUTROPHILS NFR BLD AUTO: 76.8 % — HIGH (ref 42.2–75.2)
NRBC # BLD: 0 /100 WBCS — SIGNIFICANT CHANGE UP (ref 0–0)
PLATELET # BLD AUTO: 217 K/UL — SIGNIFICANT CHANGE UP (ref 130–400)
PMV BLD: 10.8 FL — HIGH (ref 7.4–10.4)
POTASSIUM SERPL-MCNC: 4 MMOL/L — SIGNIFICANT CHANGE UP (ref 3.5–5)
POTASSIUM SERPL-SCNC: 4 MMOL/L — SIGNIFICANT CHANGE UP (ref 3.5–5)
PROT SERPL-MCNC: 7.1 G/DL — SIGNIFICANT CHANGE UP (ref 6–8)
RBC # BLD: 4.78 M/UL — SIGNIFICANT CHANGE UP (ref 4.7–6.1)
RBC # FLD: 12.6 % — SIGNIFICANT CHANGE UP (ref 11.5–14.5)
SODIUM SERPL-SCNC: 138 MMOL/L — SIGNIFICANT CHANGE UP (ref 135–146)
WBC # BLD: 11.56 K/UL — HIGH (ref 4.8–10.8)
WBC # FLD AUTO: 11.56 K/UL — HIGH (ref 4.8–10.8)

## 2023-07-23 PROCEDURE — 99233 SBSQ HOSP IP/OBS HIGH 50: CPT

## 2023-07-23 RX ADMIN — Medication 40 MILLIGRAM(S): at 17:06

## 2023-07-23 RX ADMIN — Medication 1 APPLICATION(S): at 17:06

## 2023-07-23 RX ADMIN — CEFTRIAXONE 100 MILLIGRAM(S): 500 INJECTION, POWDER, FOR SOLUTION INTRAMUSCULAR; INTRAVENOUS at 17:06

## 2023-07-23 RX ADMIN — Medication 0.5 MILLIGRAM(S): at 21:10

## 2023-07-23 RX ADMIN — HYDROMORPHONE HYDROCHLORIDE 1 MILLIGRAM(S): 2 INJECTION INTRAMUSCULAR; INTRAVENOUS; SUBCUTANEOUS at 17:07

## 2023-07-23 RX ADMIN — Medication 1 APPLICATION(S): at 06:30

## 2023-07-23 RX ADMIN — Medication 81 MILLIGRAM(S): at 12:12

## 2023-07-23 RX ADMIN — APIXABAN 10 MILLIGRAM(S): 2.5 TABLET, FILM COATED ORAL at 17:06

## 2023-07-23 RX ADMIN — CEFTRIAXONE 100 MILLIGRAM(S): 500 INJECTION, POWDER, FOR SOLUTION INTRAMUSCULAR; INTRAVENOUS at 06:44

## 2023-07-23 RX ADMIN — LINEZOLID 300 MILLIGRAM(S): 600 INJECTION, SOLUTION INTRAVENOUS at 17:05

## 2023-07-23 RX ADMIN — HYDROMORPHONE HYDROCHLORIDE 1 MILLIGRAM(S): 2 INJECTION INTRAMUSCULAR; INTRAVENOUS; SUBCUTANEOUS at 18:01

## 2023-07-23 RX ADMIN — Medication 12.5 MILLIGRAM(S): at 06:44

## 2023-07-23 RX ADMIN — Medication 40 MILLIGRAM(S): at 06:45

## 2023-07-23 RX ADMIN — HYDROMORPHONE HYDROCHLORIDE 1 MILLIGRAM(S): 2 INJECTION INTRAMUSCULAR; INTRAVENOUS; SUBCUTANEOUS at 06:59

## 2023-07-23 RX ADMIN — LOSARTAN POTASSIUM 25 MILLIGRAM(S): 100 TABLET, FILM COATED ORAL at 06:44

## 2023-07-23 RX ADMIN — ATORVASTATIN CALCIUM 10 MILLIGRAM(S): 80 TABLET, FILM COATED ORAL at 21:10

## 2023-07-23 RX ADMIN — PANTOPRAZOLE SODIUM 40 MILLIGRAM(S): 20 TABLET, DELAYED RELEASE ORAL at 06:44

## 2023-07-23 RX ADMIN — LINEZOLID 300 MILLIGRAM(S): 600 INJECTION, SOLUTION INTRAVENOUS at 06:44

## 2023-07-23 RX ADMIN — HYDROMORPHONE HYDROCHLORIDE 1 MILLIGRAM(S): 2 INJECTION INTRAMUSCULAR; INTRAVENOUS; SUBCUTANEOUS at 07:20

## 2023-07-23 RX ADMIN — Medication 12.5 MILLIGRAM(S): at 17:06

## 2023-07-23 RX ADMIN — APIXABAN 10 MILLIGRAM(S): 2.5 TABLET, FILM COATED ORAL at 06:44

## 2023-07-23 NOTE — CONSULT NOTE ADULT - REASON FOR ADMISSION
Left lower limb swelling and redness

## 2023-07-23 NOTE — PROGRESS NOTE ADULT - SUBJECTIVE AND OBJECTIVE BOX
SUBJECTIVE:    Patient is a 53y old Male who presents with a chief complaint of Left lower limb swelling and redness (22 Jul 2023 13:09)     Today is hospital day 3d. IVÁNEON.     PAST MEDICAL & SURGICAL HISTORY  COPD (chronic obstructive pulmonary disease)    Anxiety    CAD (coronary artery disease)    LAITH on CPAP    HLD (hyperlipidemia)    History of knee replacement    History of cholecystectomy    S/P flap graft      SOCIAL HISTORY:  Negative for smoking/alcohol/drug use.     ALLERGIES:  penicillins (Other)    MEDICATIONS:  STANDING MEDICATIONS  albuterol/ipratropium for Nebulization 3 milliLiter(s) Nebulizer every 6 hours  apixaban 10 milliGRAM(s) Oral every 12 hours  aspirin 81 milliGRAM(s) Oral daily  atorvastatin 10 milliGRAM(s) Oral at bedtime  buDESOnide    Inhalation Suspension 0.5 milliGRAM(s) Inhalation two times a day  cefTRIAXone   IVPB 2000 milliGRAM(s) IV Intermittent every 12 hours  clotrimazole 1% Cream 1 Application(s) Topical every 12 hours  furosemide   Injectable 40 milliGRAM(s) IV Push every 12 hours  linezolid  IVPB 600 milliGRAM(s) IV Intermittent every 12 hours  losartan 25 milliGRAM(s) Oral daily  metoprolol tartrate 12.5 milliGRAM(s) Oral every 12 hours  pantoprazole    Tablet 40 milliGRAM(s) Oral before breakfast    PRN MEDICATIONS  acetaminophen     Tablet .. 650 milliGRAM(s) Oral every 6 hours PRN  aluminum hydroxide/magnesium hydroxide/simethicone Suspension 30 milliLiter(s) Oral every 4 hours PRN  HYDROmorphone  Injectable 1 milliGRAM(s) IV Push every 6 hours PRN  melatonin 3 milliGRAM(s) Oral at bedtime PRN  ondansetron Injectable 4 milliGRAM(s) IV Push every 8 hours PRN  oxyCODONE    IR 10 milliGRAM(s) Oral every 4 hours PRN    VITALS:   T(F): 96.3  HR: 85  BP: 109/51  RR: 18  SpO2: --    LABS:                        14.8   11.62 )-----------( 202      ( 22 Jul 2023 09:16 )             46.6     07-22    139  |  91<L>  |  12  ----------------------------<  160<H>  4.0   |  38<H>  |  1.0    Ca    8.7      22 Jul 2023 09:16  Mg     2.4     07-22    TPro  6.7  /  Alb  3.6  /  TBili  0.6  /  DBili  x   /  AST  29  /  ALT  30  /  AlkPhos  95  07-22    PT/INR - ( 21 Jul 2023 08:38 )   PT: 12.10 sec;   INR: 1.06 ratio         PTT - ( 21 Jul 2023 09:00 )  PTT:41.1 sec  Urinalysis Basic - ( 22 Jul 2023 09:16 )    Color: x / Appearance: x / SG: x / pH: x  Gluc: 160 mg/dL / Ketone: x  / Bili: x / Urobili: x   Blood: x / Protein: x / Nitrite: x   Leuk Esterase: x / RBC: x / WBC x   Sq Epi: x / Non Sq Epi: x / Bacteria: x            Culture - Urine (collected 20 Jul 2023 13:09)  Source: Clean Catch Clean Catch (Midstream)  Final Report (21 Jul 2023 21:17):    No growth    Culture - Blood (collected 20 Jul 2023 04:20)  Source: .Blood Blood-Peripheral  Preliminary Report (22 Jul 2023 15:09):    No growth at 48 Hours    Culture - Blood (collected 20 Jul 2023 04:20)  Source: .Blood Blood-Peripheral  Preliminary Report (22 Jul 2023 15:09):    No growth at 48 Hours      RADIOLOGY:    PHYSICAL EXAM:  GEN: No acute distress  LUNGS: Clear to auscultation bilaterally   HEART: Regular  ABD: Soft, non-tender, non-distended.  EXT: NC/NC/NE/2+PP/IBANEZ/Skin Intact.   NEURO: AAOX3    Intravenous access:   NG tube:   Celeste Catheter:        SUBJECTIVE:    Patient is a 53y old Male who presents with a chief complaint of Left lower limb swelling and redness (22 Jul 2023 13:09)     Today is hospital day 3d. NAEON. No acute complaints.    PAST MEDICAL & SURGICAL HISTORY  COPD (chronic obstructive pulmonary disease)    Anxiety    CAD (coronary artery disease)    LAITH on CPAP    HLD (hyperlipidemia)    History of knee replacement    History of cholecystectomy    S/P flap graft      SOCIAL HISTORY:  Negative for smoking/alcohol/drug use.     ALLERGIES:  penicillins (Other)    MEDICATIONS:  STANDING MEDICATIONS  albuterol/ipratropium for Nebulization 3 milliLiter(s) Nebulizer every 6 hours  apixaban 10 milliGRAM(s) Oral every 12 hours  aspirin 81 milliGRAM(s) Oral daily  atorvastatin 10 milliGRAM(s) Oral at bedtime  buDESOnide    Inhalation Suspension 0.5 milliGRAM(s) Inhalation two times a day  cefTRIAXone   IVPB 2000 milliGRAM(s) IV Intermittent every 12 hours  clotrimazole 1% Cream 1 Application(s) Topical every 12 hours  furosemide   Injectable 40 milliGRAM(s) IV Push every 12 hours  linezolid  IVPB 600 milliGRAM(s) IV Intermittent every 12 hours  losartan 25 milliGRAM(s) Oral daily  metoprolol tartrate 12.5 milliGRAM(s) Oral every 12 hours  pantoprazole    Tablet 40 milliGRAM(s) Oral before breakfast    PRN MEDICATIONS  acetaminophen     Tablet .. 650 milliGRAM(s) Oral every 6 hours PRN  aluminum hydroxide/magnesium hydroxide/simethicone Suspension 30 milliLiter(s) Oral every 4 hours PRN  HYDROmorphone  Injectable 1 milliGRAM(s) IV Push every 6 hours PRN  melatonin 3 milliGRAM(s) Oral at bedtime PRN  ondansetron Injectable 4 milliGRAM(s) IV Push every 8 hours PRN  oxyCODONE    IR 10 milliGRAM(s) Oral every 4 hours PRN    VITALS:   T(F): 96.3  HR: 85  BP: 109/51  RR: 18  SpO2: --    LABS:                        14.8   11.62 )-----------( 202      ( 22 Jul 2023 09:16 )             46.6     07-22    139  |  91<L>  |  12  ----------------------------<  160<H>  4.0   |  38<H>  |  1.0    Ca    8.7      22 Jul 2023 09:16  Mg     2.4     07-22    TPro  6.7  /  Alb  3.6  /  TBili  0.6  /  DBili  x   /  AST  29  /  ALT  30  /  AlkPhos  95  07-22    PT/INR - ( 21 Jul 2023 08:38 )   PT: 12.10 sec;   INR: 1.06 ratio         PTT - ( 21 Jul 2023 09:00 )  PTT:41.1 sec  Urinalysis Basic - ( 22 Jul 2023 09:16 )    Color: x / Appearance: x / SG: x / pH: x  Gluc: 160 mg/dL / Ketone: x  / Bili: x / Urobili: x   Blood: x / Protein: x / Nitrite: x   Leuk Esterase: x / RBC: x / WBC x   Sq Epi: x / Non Sq Epi: x / Bacteria: x            Culture - Urine (collected 20 Jul 2023 13:09)  Source: Clean Catch Clean Catch (Midstream)  Final Report (21 Jul 2023 21:17):    No growth    Culture - Blood (collected 20 Jul 2023 04:20)  Source: .Blood Blood-Peripheral  Preliminary Report (22 Jul 2023 15:09):    No growth at 48 Hours    Culture - Blood (collected 20 Jul 2023 04:20)  Source: .Blood Blood-Peripheral  Preliminary Report (22 Jul 2023 15:09):    No growth at 48 Hours      RADIOLOGY:    PHYSICAL EXAM:  GEN: No acute distress  LUNGS: Clear to auscultation bilaterally   HEART: Regular  ABD: Soft, non-tender, non-distended.  EXT: NC/NC/NE/2+PP/IBANEZ/Skin Intact.   NEURO: AAOX3    Intravenous access:   NG tube:   Celeste Catheter:        SUBJECTIVE:    Patient is a 53y old Male who presents with a chief complaint of Left lower limb swelling and redness (22 Jul 2023 13:09)     Today is hospital day 3d. NAEON. Patient self reported improvement in movement and pain.    PAST MEDICAL & SURGICAL HISTORY  COPD (chronic obstructive pulmonary disease)    Anxiety    CAD (coronary artery disease)    LAITH on CPAP    HLD (hyperlipidemia)    History of knee replacement    History of cholecystectomy    S/P flap graft      SOCIAL HISTORY:  Negative for smoking/alcohol/drug use.     ALLERGIES:  penicillins (Other)    MEDICATIONS:  STANDING MEDICATIONS  albuterol/ipratropium for Nebulization 3 milliLiter(s) Nebulizer every 6 hours  apixaban 10 milliGRAM(s) Oral every 12 hours  aspirin 81 milliGRAM(s) Oral daily  atorvastatin 10 milliGRAM(s) Oral at bedtime  buDESOnide    Inhalation Suspension 0.5 milliGRAM(s) Inhalation two times a day  cefTRIAXone   IVPB 2000 milliGRAM(s) IV Intermittent every 12 hours  clotrimazole 1% Cream 1 Application(s) Topical every 12 hours  furosemide   Injectable 40 milliGRAM(s) IV Push every 12 hours  linezolid  IVPB 600 milliGRAM(s) IV Intermittent every 12 hours  losartan 25 milliGRAM(s) Oral daily  metoprolol tartrate 12.5 milliGRAM(s) Oral every 12 hours  pantoprazole    Tablet 40 milliGRAM(s) Oral before breakfast    PRN MEDICATIONS  acetaminophen     Tablet .. 650 milliGRAM(s) Oral every 6 hours PRN  aluminum hydroxide/magnesium hydroxide/simethicone Suspension 30 milliLiter(s) Oral every 4 hours PRN  HYDROmorphone  Injectable 1 milliGRAM(s) IV Push every 6 hours PRN  melatonin 3 milliGRAM(s) Oral at bedtime PRN  ondansetron Injectable 4 milliGRAM(s) IV Push every 8 hours PRN  oxyCODONE    IR 10 milliGRAM(s) Oral every 4 hours PRN    VITALS:   T(F): 96.3  HR: 85  BP: 109/51  RR: 18  SpO2: --    LABS:                        14.8   11.62 )-----------( 202      ( 22 Jul 2023 09:16 )             46.6     07-22    139  |  91<L>  |  12  ----------------------------<  160<H>  4.0   |  38<H>  |  1.0    Ca    8.7      22 Jul 2023 09:16  Mg     2.4     07-22    TPro  6.7  /  Alb  3.6  /  TBili  0.6  /  DBili  x   /  AST  29  /  ALT  30  /  AlkPhos  95  07-22    PT/INR - ( 21 Jul 2023 08:38 )   PT: 12.10 sec;   INR: 1.06 ratio         PTT - ( 21 Jul 2023 09:00 )  PTT:41.1 sec  Urinalysis Basic - ( 22 Jul 2023 09:16 )    Color: x / Appearance: x / SG: x / pH: x  Gluc: 160 mg/dL / Ketone: x  / Bili: x / Urobili: x   Blood: x / Protein: x / Nitrite: x   Leuk Esterase: x / RBC: x / WBC x   Sq Epi: x / Non Sq Epi: x / Bacteria: x            Culture - Urine (collected 20 Jul 2023 13:09)  Source: Clean Catch Clean Catch (Midstream)  Final Report (21 Jul 2023 21:17):    No growth    Culture - Blood (collected 20 Jul 2023 04:20)  Source: .Blood Blood-Peripheral  Preliminary Report (22 Jul 2023 15:09):    No growth at 48 Hours    Culture - Blood (collected 20 Jul 2023 04:20)  Source: .Blood Blood-Peripheral  Preliminary Report (22 Jul 2023 15:09):    No growth at 48 Hours      RADIOLOGY:    PHYSICAL EXAM:  GEN: No acute distress  LUNGS: Clear to auscultation bilaterally   HEART: Regular  ABD: Soft, non-tender, non-distended.  EXT: NC/NC/NE/2+PP/IBANEZ/Skin Intact.   NEURO: AAOX3    Intravenous access:   NG tube:   Celeste Catheter:

## 2023-07-23 NOTE — PROGRESS NOTE ADULT - SUBJECTIVE AND OBJECTIVE BOX
53y old Male who presents with a chief complaint of Left lower limb swelling and redness, he was found to have a soft tissue infection in LLE and extensive LLE DVT, consulted by vascular surgery, no intervention recommended, pt was started on Eliquis 10 mg Q 12 hours on 7/21. He was consulted by ID, on broad spectrum ABx now. Pt is morbidly obese, has h/o CHF, LAITH, gain a lot of weight recently, on IV LAsix now, dose was changed today to 40 MG Q 12 hours and BIPAP QHS.  Today pt is able to band his left knee, has less pain, has difficulty ambulating due to LE pain, swelling and body habitus.     PAST MEDICAL & SURGICAL HISTORY  COPD (chronic obstructive pulmonary disease)    Anxiety    CAD (coronary artery disease)    LAITH on CPAP    HLD (hyperlipidemia)    History of knee replacement    History of cholecystectomy    S/P flap graft      SOCIAL HISTORY:  Negative for smoking/alcohol/drug use.     ALLERGIES:  penicillins (Other)    VITALS:   T(C): 37 (23 Jul 2023 04:47), Max: 37 (22 Jul 2023 14:54)  T(F): 98.6 (23 Jul 2023 04:47), Max: 98.6 (22 Jul 2023 14:54)  HR: 95 (23 Jul 2023 05:29) (85 - 95)  BP: 95/51 (23 Jul 2023 04:47) (95/51 - 116/54)  BP(mean): --  RR: 18 (23 Jul 2023 04:47) (18 - 18)  SpO2: 97% (23 Jul 2023 05:29) (97% - 97%)        PHYSICAL EXAM:  GEN: No acute distress, morbidly obese, pleasant   NECK: supple, wide neck   LUNGS: distant BS, shallow breathing    HEART: Regular, S1, S1   ABD: obese,  Soft, non-tender, non-distended, BS present   EXT: 3 + edema on LLE edema with a large area of induration and erythema above the left knee all the way up to the groin.   NEURO: AAOX3, moving all extremities, no focal neuro deficit       LABS:                                   15.1   11.56 )-----------( 217      ( 23 Jul 2023 07:47 )             46.3   07-23    138  |  91<L>  |  15  ----------------------------<  189<H>  4.0   |  38<H>  |  0.9    Ca    9.0      23 Jul 2023 07:47  Mg     2.0     07-23    TPro  7.1  /  Alb  3.8  /  TBili  0.5  /  DBili  x   /  AST  36  /  ALT  45<H>  /  AlkPhos  102  07-23      Urinalysis Basic - ( 20 Jul 2023 04:20 )    Color: x / Appearance: x / SG: x / pH: x  Gluc: 181 mg/dL / Ketone: x  / Bili: x / Urobili: x   Blood: x / Protein: x / Nitrite: x   Leuk Esterase: x / RBC: x / WBC x   Sq Epi: x / Non Sq Epi: x / Bacteria: x      RADIOLOGY:    < from: Xray Chest 1 View- PORTABLE-Routine (Xray Chest 1 View- PORTABLE-Routine .) (07.21.23 @ 13:38) >  Impression:    Cardiomegaly with mild CHF.    < end of copied text >  < from: [facility] Duplex Lower Ext Vein Scan, Bilat (07.20.23 @ 10:07) >    Impression:    No evidence of deep venous thrombosis or superficial thrombophlebitis in   the right lower extremity.    Acute appearing deep venous thrombosis of the left common femoral,   femoral and popliteal veins. Superficial thrombophlebitis of the left   greater saphenous vein.    < end of copied text >  < from: CT Lower Extremity w/ IV Cont, Left (07.20.23 @ 05:06) >  IMPRESSION:    1.  No evidence for soft tissue gas, abscess, abnormal differential   enhancement or osteomyelitis is identified.  2.  Subcutaneous fat infiltration and edema of the entire left lower   extremity. Correlate for cellulitis.    MEDICATIONS  (STANDING):    albuterol/ipratropium for Nebulization 3 milliLiter(s) Nebulizer every 6 hours  apixaban 10 milliGRAM(s) Oral every 12 hours  aspirin 81 milliGRAM(s) Oral daily  atorvastatin 10 milliGRAM(s) Oral at bedtime  buDESOnide    Inhalation Suspension 0.5 milliGRAM(s) Inhalation two times a day  cefTRIAXone   IVPB 2000 milliGRAM(s) IV Intermittent every 12 hours  clotrimazole 1% Cream 1 Application(s) Topical every 12 hours  furosemide   Injectable 40 milliGRAM(s) IV Push every 12 hours  linezolid  IVPB 600 milliGRAM(s) IV Intermittent every 12 hours  losartan 25 milliGRAM(s) Oral daily  metolazone 5 milliGRAM(s) Oral once  metoprolol tartrate 12.5 milliGRAM(s) Oral every 12 hours  pantoprazole    Tablet 40 milliGRAM(s) Oral before breakfast    MEDICATIONS  (PRN):  acetaminophen     Tablet .. 650 milliGRAM(s) Oral every 6 hours PRN Temp greater or equal to 38C (100.4F), Mild Pain (1 - 3)  aluminum hydroxide/magnesium hydroxide/simethicone Suspension 30 milliLiter(s) Oral every 4 hours PRN Dyspepsia  HYDROmorphone  Injectable 1 milliGRAM(s) IV Push every 6 hours PRN Severe Pain (7 - 10)  melatonin 3 milliGRAM(s) Oral at bedtime PRN Insomnia  ondansetron Injectable 4 milliGRAM(s) IV Push every 8 hours PRN Nausea and/or Vomiting  oxyCODONE    IR 10 milliGRAM(s) Oral every 4 hours PRN Mild Pain (1 - 3)

## 2023-07-23 NOTE — PROGRESS NOTE ADULT - ASSESSMENT
54 yo m hx of copd (On 2.5L/min O2 PRN at home), CAD (hx of MI in April 2021), LAITH (On CPAP), HLD and obesity, presented for cellulitis, no signs of sepsis, was found to have acute DVT, LLE cellulitis and acute CHF.     A/P   # LLE Cellulitis  - CT: Cellulitis, no necrotizing fascitis  - No fascitis/pyomyositis/abscess or compartment syndrome  - on Linezolid 600 mg iv q12h and Rocephin 2 gm iv q12h  - f/u nasal swab for MRSA  - 7/23: bcx and ucx ngtd  - keep LE elevated   - start topical antifungal cream to toe nails     #LLE pain 2/2 Extensive DVT   - consulted by vascular surgery, no intervention recommended   - started on Eliquis 10 mg BID   - ACE wrap to LE, elevated LE   - DVT likely provoked     # Acute on chronic suspected diastolic CHF/ LAITH/ morbid obesity/ OHS  - fluid restriction 1200 ml in 24 hours  - intake and output monitoring, daily weight   - increase IV Lasix 40 mg to Q 12 hours,  keep negative balance  - f/u TTE  - encourage compliance with BIPAP (ordered today), pt has no CPAP at the bedside     # h/o CAD/ MI  - on ASA, statin, BB   - OP f/u with Dr Seaman     # GI prophylaxis    Misc:  DVT ppx: eliquis  GI ppx: Pantoprazol  Diet: Regular, low fat  Activity: As tolerated, PT ordered  Code: Full Code

## 2023-07-23 NOTE — CONSULT NOTE ADULT - SUBJECTIVE AND OBJECTIVE BOX
Podiatry Consult Note    Subjective:    SHANKAR HECTOR is a 53y year old Male seen at bedside with a chief complaint of  painful thickened, dystrophic, ingrowing and long toenails digits 1-5 bilaterally  and preventative foot examination. Patient is medically managed  by Medicine/Hospitalists.  Patient denies any history of trauma to both feet. Patient has no other pedal complaints.  Patient is experiencing pain while standing, walking and in shoe gear.     PMH: COPD (chronic obstructive pulmonary disease)    Anxiety    CAD (coronary artery disease)    LAITH on CPAP    HLD (hyperlipidemia)     PAST MEDICAL & SURGICAL HISTORY:  COPD (chronic obstructive pulmonary disease)      Anxiety      CAD (coronary artery disease)      LAITH on CPAP      HLD (hyperlipidemia)      History of knee replacement      History of cholecystectomy      S/P flap graft        PSH:No significant past surgical history    History of knee replacement    History of cholecystectomy    S/P flap graft      Allergies:penicillins (Other)      Labs:                        15.1   11.56 )-----------( 217      ( 23 Jul 2023 07:47 )             46.3       WBC Trend  11.56<H> Date (07-23 @ 07:47)  11.62<H> Date (07-22 @ 09:16)  10.94<H> Date (07-21 @ 08:38)  11.68<H> Date (07-20 @ 22:14)  13.80<H> Date (07-20 @ 04:20)      Chem  07-23    138  |  91<L>  |  15  ----------------------------<  189<H>  4.0   |  38<H>  |  0.9    Ca    9.0      23 Jul 2023 07:47  Mg     2.0     07-23    TPro  7.1  /  Alb  3.8  /  TBili  0.5  /  DBili  x   /  AST  36  /  ALT  45<H>  /  AlkPhos  102  07-23      T(F): 98.6 (07-23-23 @ 04:47), Max: 98.6 (07-23-23 @ 04:47)  HR: 95 (07-23-23 @ 05:29) (85 - 95)  BP: 95/51 (07-23-23 @ 04:47) (95/51 - 109/51)  RR: 18 (07-23-23 @ 04:47) (18 - 18)  SpO2: 97% (07-23-23 @ 05:29) (97% - 97%)  Wt(kg): --    Objective:   DERM:  Skin warm, dry and supple bilateral.  No open lesions or inter-digital macerations noted bilateral.   Toenails 1-5 Right and Left feet thickened, elongated, discolored, and dystrophic with subungual debris. There is pain upon palpation of all fungal and ingrowing nails 1-5 bilaterally.   VASC: Dorsalis Pedis and Posterior Tibial pulses 2/4.  Capillary re-fill time less than 3 seconds digits 1-5 bilateral.   NEURO: Protective sensation intact to the level of the digits bilateral.  MSK: Muscle strength 5/5 all major muscle groups bilateral. No structural abnormality, bilaterally    Assessment:   Bilateral dystrophic toenails consistent with  Onychomycosis.   Pain from Elongated nails, ingrowing, incurvated,  and dystrophic nails upon palpation of nails.  Xerosis of bilateral plantar feet.     Plan:   Discussed diagnosis and treatment with patient  Patient declined debridement of nails  Discussed importance of daily foot examinations, drying of feet after bathing and proper shoe gear.  Patient Would Benefit From Periodic Debridements; Can Follow Up as Outpatient w/ Dr. Ray Post Discharge   May continue with clotrimazole for feet; patient is using daily currently  Podiatry will sign off  Discussed Plan w/ Attending;     Spectra;

## 2023-07-23 NOTE — PROGRESS NOTE ADULT - ASSESSMENT
54 yo m hx of copd (On 2.5L/min O2 PRN at home), CAD (hx of MI in April 2021), LAITH (On CPAP), HLD and obesity, presented for cellulitis, no signs of sepsis, was found to have acute DVT, LLE cellulitis and acute CHF.     A/P   # LLE Cellulitis  - CT: Cellulitis, no necrotizing fascitis  -No fascitis/pyomyositis/abscess or  compartment syndrome  - on Linezolid 600 mg iv q12h and Rocephin 2 gm iv q12h  - f/u nasal swab for MRSA  - keep LE elevated   -  topical antifungal cream to toe nails , consult podiatry     #LLE pain 2/2 Extensive DVT   - consulted by vascular surgery, no intervention recommended   - started on Eliquis 10 mg BID   - ACE wrap to LE, elevated LE   - DVT likely provoked     # Acute on chronic suspected diastolic CHF/ LAITH/ morbid obesity/ OHS  - fluid restriction 1200 ml in 24 hours  - intake and output monitoring, daily weight   - on   IV Lasix 40 mg to Q 12 hours, give one dose of Metolazone 5 mg today,  keep negative balance  - get TTE  - encourage compliance with BIPAP     # h/o CAD/ MI  - on ASA, statin, BB   - OP f/u with Dr Seaman     # GI prophylaxis    #Progress Note Handoff  Pending (specify):  price Eliquis, give one dose of Metolazone today, consult podiatry,  keep negative balance, f/u TTE, supportive care, encourage ambulation  Family discussion: I spoke with pt, he agreed with a plan of care   Disposition: Home__x _/SNF___/Other________/Unknown at this time________

## 2023-07-24 ENCOUNTER — TRANSCRIPTION ENCOUNTER (OUTPATIENT)
Age: 53
End: 2023-07-24

## 2023-07-24 LAB
ALBUMIN SERPL ELPH-MCNC: 3.8 G/DL — SIGNIFICANT CHANGE UP (ref 3.5–5.2)
ALP SERPL-CCNC: 99 U/L — SIGNIFICANT CHANGE UP (ref 30–115)
ALT FLD-CCNC: 47 U/L — HIGH (ref 0–41)
ANION GAP SERPL CALC-SCNC: 11 MMOL/L — SIGNIFICANT CHANGE UP (ref 7–14)
AST SERPL-CCNC: 31 U/L — SIGNIFICANT CHANGE UP (ref 0–41)
BASOPHILS # BLD AUTO: 0.05 K/UL — SIGNIFICANT CHANGE UP (ref 0–0.2)
BASOPHILS NFR BLD AUTO: 0.5 % — SIGNIFICANT CHANGE UP (ref 0–1)
BILIRUB SERPL-MCNC: 0.5 MG/DL — SIGNIFICANT CHANGE UP (ref 0.2–1.2)
BUN SERPL-MCNC: 19 MG/DL — SIGNIFICANT CHANGE UP (ref 10–20)
CALCIUM SERPL-MCNC: 9.2 MG/DL — SIGNIFICANT CHANGE UP (ref 8.4–10.5)
CHLORIDE SERPL-SCNC: 87 MMOL/L — LOW (ref 98–110)
CO2 SERPL-SCNC: 38 MMOL/L — HIGH (ref 17–32)
CREAT SERPL-MCNC: 1.1 MG/DL — SIGNIFICANT CHANGE UP (ref 0.7–1.5)
EGFR: 80 ML/MIN/1.73M2 — SIGNIFICANT CHANGE UP
EOSINOPHIL # BLD AUTO: 0.33 K/UL — SIGNIFICANT CHANGE UP (ref 0–0.7)
EOSINOPHIL NFR BLD AUTO: 3 % — SIGNIFICANT CHANGE UP (ref 0–8)
GLUCOSE SERPL-MCNC: 196 MG/DL — HIGH (ref 70–99)
HCT VFR BLD CALC: 46 % — SIGNIFICANT CHANGE UP (ref 42–52)
HGB BLD-MCNC: 15 G/DL — SIGNIFICANT CHANGE UP (ref 14–18)
IMM GRANULOCYTES NFR BLD AUTO: 0.5 % — HIGH (ref 0.1–0.3)
LYMPHOCYTES # BLD AUTO: 1.57 K/UL — SIGNIFICANT CHANGE UP (ref 1.2–3.4)
LYMPHOCYTES # BLD AUTO: 14.4 % — LOW (ref 20.5–51.1)
MAGNESIUM SERPL-MCNC: 2 MG/DL — SIGNIFICANT CHANGE UP (ref 1.8–2.4)
MCHC RBC-ENTMCNC: 31.8 PG — HIGH (ref 27–31)
MCHC RBC-ENTMCNC: 32.6 G/DL — SIGNIFICANT CHANGE UP (ref 32–37)
MCV RBC AUTO: 97.7 FL — HIGH (ref 80–94)
MONOCYTES # BLD AUTO: 0.74 K/UL — HIGH (ref 0.1–0.6)
MONOCYTES NFR BLD AUTO: 6.8 % — SIGNIFICANT CHANGE UP (ref 1.7–9.3)
NEUTROPHILS # BLD AUTO: 8.2 K/UL — HIGH (ref 1.4–6.5)
NEUTROPHILS NFR BLD AUTO: 74.8 % — SIGNIFICANT CHANGE UP (ref 42.2–75.2)
NRBC # BLD: 0 /100 WBCS — SIGNIFICANT CHANGE UP (ref 0–0)
PLATELET # BLD AUTO: 213 K/UL — SIGNIFICANT CHANGE UP (ref 130–400)
PMV BLD: 10.7 FL — HIGH (ref 7.4–10.4)
POTASSIUM SERPL-MCNC: 3.9 MMOL/L — SIGNIFICANT CHANGE UP (ref 3.5–5)
POTASSIUM SERPL-SCNC: 3.9 MMOL/L — SIGNIFICANT CHANGE UP (ref 3.5–5)
PROT SERPL-MCNC: 7.1 G/DL — SIGNIFICANT CHANGE UP (ref 6–8)
RBC # BLD: 4.71 M/UL — SIGNIFICANT CHANGE UP (ref 4.7–6.1)
RBC # FLD: 12.6 % — SIGNIFICANT CHANGE UP (ref 11.5–14.5)
SODIUM SERPL-SCNC: 136 MMOL/L — SIGNIFICANT CHANGE UP (ref 135–146)
WBC # BLD: 10.94 K/UL — HIGH (ref 4.8–10.8)
WBC # FLD AUTO: 10.94 K/UL — HIGH (ref 4.8–10.8)

## 2023-07-24 PROCEDURE — 93306 TTE W/DOPPLER COMPLETE: CPT | Mod: 26

## 2023-07-24 PROCEDURE — 99233 SBSQ HOSP IP/OBS HIGH 50: CPT

## 2023-07-24 RX ADMIN — LINEZOLID 300 MILLIGRAM(S): 600 INJECTION, SOLUTION INTRAVENOUS at 06:40

## 2023-07-24 RX ADMIN — CEFTRIAXONE 100 MILLIGRAM(S): 500 INJECTION, POWDER, FOR SOLUTION INTRAMUSCULAR; INTRAVENOUS at 06:43

## 2023-07-24 RX ADMIN — HYDROMORPHONE HYDROCHLORIDE 1 MILLIGRAM(S): 2 INJECTION INTRAMUSCULAR; INTRAVENOUS; SUBCUTANEOUS at 11:36

## 2023-07-24 RX ADMIN — HYDROMORPHONE HYDROCHLORIDE 1 MILLIGRAM(S): 2 INJECTION INTRAMUSCULAR; INTRAVENOUS; SUBCUTANEOUS at 01:50

## 2023-07-24 RX ADMIN — ATORVASTATIN CALCIUM 10 MILLIGRAM(S): 80 TABLET, FILM COATED ORAL at 21:49

## 2023-07-24 RX ADMIN — CEFTRIAXONE 100 MILLIGRAM(S): 500 INJECTION, POWDER, FOR SOLUTION INTRAMUSCULAR; INTRAVENOUS at 17:48

## 2023-07-24 RX ADMIN — HYDROMORPHONE HYDROCHLORIDE 1 MILLIGRAM(S): 2 INJECTION INTRAMUSCULAR; INTRAVENOUS; SUBCUTANEOUS at 10:06

## 2023-07-24 RX ADMIN — Medication 40 MILLIGRAM(S): at 17:47

## 2023-07-24 RX ADMIN — APIXABAN 10 MILLIGRAM(S): 2.5 TABLET, FILM COATED ORAL at 06:21

## 2023-07-24 RX ADMIN — Medication 12.5 MILLIGRAM(S): at 06:22

## 2023-07-24 RX ADMIN — LINEZOLID 300 MILLIGRAM(S): 600 INJECTION, SOLUTION INTRAVENOUS at 17:47

## 2023-07-24 RX ADMIN — LOSARTAN POTASSIUM 25 MILLIGRAM(S): 100 TABLET, FILM COATED ORAL at 06:22

## 2023-07-24 RX ADMIN — PANTOPRAZOLE SODIUM 40 MILLIGRAM(S): 20 TABLET, DELAYED RELEASE ORAL at 06:22

## 2023-07-24 RX ADMIN — APIXABAN 10 MILLIGRAM(S): 2.5 TABLET, FILM COATED ORAL at 17:48

## 2023-07-24 RX ADMIN — HYDROMORPHONE HYDROCHLORIDE 1 MILLIGRAM(S): 2 INJECTION INTRAMUSCULAR; INTRAVENOUS; SUBCUTANEOUS at 19:28

## 2023-07-24 RX ADMIN — Medication 81 MILLIGRAM(S): at 11:37

## 2023-07-24 RX ADMIN — Medication 1 APPLICATION(S): at 17:49

## 2023-07-24 RX ADMIN — Medication 40 MILLIGRAM(S): at 06:22

## 2023-07-24 RX ADMIN — Medication 12.5 MILLIGRAM(S): at 17:48

## 2023-07-24 RX ADMIN — HYDROMORPHONE HYDROCHLORIDE 1 MILLIGRAM(S): 2 INJECTION INTRAMUSCULAR; INTRAVENOUS; SUBCUTANEOUS at 01:17

## 2023-07-24 RX ADMIN — Medication 1 APPLICATION(S): at 06:22

## 2023-07-24 NOTE — DISCHARGE NOTE PROVIDER - PROVIDER TOKENS
PROVIDER:[TOKEN:[[id number]]],PROVIDER:[TOKEN:[[id number]]],PROVIDER:[TOKEN:[[id number]]] PROVIDER:[TOKEN:[[id number]]],PROVIDER:[TOKEN:[[id number]]],PROVIDER:[TOKEN:[[id number]]],PROVIDER:[TOKEN:[[id number]]]

## 2023-07-24 NOTE — DISCHARGE NOTE PROVIDER - CARE PROVIDER_API CALL
Stacy Duong  Internal Medicine  [address] [place] [address]  Phone: [phone]  Fax: [phone]  Follow Up Time:     Diaz Long  Cardiovascular Disease  [address] [facility], [address]  [place] [address]  Phone: [phone]  Fax: [phone]  Follow Up Time:     Griffin Ray  Podiatric Medicine and Surgery  [address] [facility], [address], [address]  [place] [address]  Phone: [phone]  Fax: [phone]  Follow Up Time:    Stacy Duong  Internal Medicine  [address] [place] [address]  Phone: [phone]  Fax: [phone]  Follow Up Time:     Diaz Long  Cardiovascular Disease  [address] [facility], [address]  [place] [address]  Phone: [phone]  Fax: [phone]  Follow Up Time:     Griffin Ray  Podiatric Medicine and Surgery  [address] [facility], [address], [address]  [place] [address]  Phone: [phone]  Fax: [phone]  Follow Up Time:     Colton Constantino  Vascular Surgery  [address] [facility] [address] [facility], [address]  [place] [address]  Phone: [phone]  Fax: [phone]  Follow Up Time:

## 2023-07-24 NOTE — PROGRESS NOTE ADULT - ASSESSMENT
54 yo m hx of copd (On 2.5L/min O2 PRN at home), CAD (hx of MI in April 2021), LAITH (On CPAP), HLD and obesity, presented for cellulitis, no signs of sepsis, was found to have acute DVT, LLE cellulitis and acute CHF.     A/P   # LLE Cellulitis  - CT: Cellulitis, no necrotizing fascitis  -No fascitis/pyomyositis/abscess or  compartment syndrome  - on Linezolid 600 mg iv q12h and Rocephin 2 gm iv q12h while in the hospital, po Vantin and Doxy recommended on discharge   - keep LE elevated   - podiatry consult appreciated     #LLE pain 2/2 Extensive DVT   - consulted by vascular surgery, no intervention recommended   - c/w  Eliquis 10 mg BID, will price it today   - ACE wrap to LE, elevated LE   - DVT likely provoked     # Acute on chronic suspected diastolic CHF/ LAITH/ morbid obesity/ OHS  - fluid restriction 1200 ml in 24 hours  - intake and output monitoring, daily weight   - on   IV Lasix 40 mg to Q 12 hours,  keep negative balance, will change Lasix to po in 24 hours   - TTE noted   - encourage compliance with BIPAP     # h/o CAD/ MI  - on ASA, statin, BB   - OP f/u with Dr Seaman     # GI prophylaxis    #Progress Note Handoff  Pending (specify):  price Eliquis, reach out to pt's cardiology, will change Lasix to po in 24 hours, anticipate discharge   Family discussion: I spoke with pt, he agreed with a plan of care   Disposition: Home__x _/SNF___/Other________/Unknown at this time________

## 2023-07-24 NOTE — PROGRESS NOTE ADULT - ASSESSMENT
· Assessment	  54 yo m hx of copd (On 2.5L/min O2 PRN at home), CAD (hx of MI in April 2021), LAITH (On CPAP), HLD and obesity, presented for cellulitis, no signs of sepsis, was found to have acute DVT, LLE cellulitis and acute CHF.     A/P   # LLE Cellulitis  - CT: Cellulitis, no necrotizing fascitis  - No fascitis/pyomyositis/abscess or compartment syndrome  - on Linezolid 600 mg iv q12h and Rocephin 2 gm iv q12h  - f/u nasal swab for MRSA  - 7/23: bcx and ucx ngtd  - keep LE elevated   - start topical antifungal cream to toe nails     #LLE pain 2/2 Extensive DVT   - consulted by vascular surgery, no intervention recommended   - started on Eliquis 10 mg BID   - ACE wrap to LE, elevated LE   - DVT likely provoked     #Bilateral dystrophic toenails consistent with Onychomycosis  - podiatry consulted, rec appreciated  - patient refused debridement of toenails    # Acute on chronic suspected diastolic CHF/ LAITH/ morbid obesity/ OHS  - fluid restriction 1200 ml in 24 hours  - intake and output monitoring, daily weight   - increase IV Lasix 40 mg to Q 12 hours,  keep negative balance  - f/u TTE  - encourage compliance with BIPAP (ordered today), pt has no CPAP at the bedside     # h/o CAD/ MI  - on ASA, statin, BB   - OP f/u with Dr Seaman     # GI prophylaxis    Misc:  DVT ppx: eliquis  GI ppx: Pantoprazol  Diet: Regular, low fat  Activity: As tolerated, PT ordered  Code: Full Code   · Assessment	  54 yo m hx of copd (On 2.5L/min O2 PRN at home), CAD (hx of MI in April 2021), LAITH (On CPAP), HLD and obesity, presented for cellulitis, no signs of sepsis, was found to have acute DVT, LLE cellulitis and acute CHF.     A/P   # LLE Cellulitis  - CT: Cellulitis, no necrotizing fascitis  - No fascitis/pyomyositis/abscess or compartment syndrome  - c/w Linezolid 600 mg iv q12h and Rocephin 2 gm iv q12h  - f/u nasal swab for MRSA  - 7/23: bcx and ucx ngtd  - keep LE elevated   - start topical antifungal cream to toe nails     #LLE pain 2/2 Extensive DVT   - consulted by vascular surgery, no intervention recommended   - started on Eliquis 10 mg BID   - ACE wrap to LE, elevated LE   - DVT likely provoked     #Bilateral dystrophic toenails consistent with Onychomycosis  - podiatry consulted, rec appreciated  - patient refused debridement of toenails    # Acute on chronic suspected diastolic CHF/ LAITH/ morbid obesity/ OHS  - fluid restriction 1200 ml in 24 hours  - intake and output monitoring, daily weight   - increased IV Lasix 40 mg to Q 12 hours,  keep negative balance  - TTE noted  - encourage compliance with BIPAP     # h/o CAD/ MI  - on ASA, statin, BB   - OP f/u with Dr Seaman     # GI prophylaxis    Misc:  DVT ppx: eliquis  GI ppx: Pantoprazol  Diet: Regular, low fat  Activity: As tolerated, PT ordered  Code: Full Code

## 2023-07-24 NOTE — DISCHARGE NOTE PROVIDER - NSDCCPCAREPLAN_GEN_ALL_CORE_FT
PRINCIPAL DISCHARGE DIAGNOSIS  Diagnosis: Cellulitis of left lower leg  Assessment and Plan of Treatment: You came to the hospital for left lower extremity swelling, redness and pain. You were found to have cellulitis. Duplex was performed and you were found to have DVT in the femoral and popliteal veins. Infecious disease was consulted and you were started on antibiotics. Vascular surgery was consulted and no further surgical intervention needed. You were started on eliquis for DVT. Physical therapy was consulted for aiding your movement and healing. Your symptoms improved and you are now stable for discharge. Please take medication as prescribed and follow up outpatient clinics as described.

## 2023-07-24 NOTE — DISCHARGE NOTE PROVIDER - INSTRUCTIONS
sodium and cholesterol restricted sodium and cholesterol restricted  1200mL fluid restriction per day

## 2023-07-24 NOTE — DISCHARGE NOTE PROVIDER - HOSPITAL COURSE
52 yo m hx of copd (On 2.5L/min O2 PRN at home), CAD (hx of MI in April 2021), LAITH (On CPAP), HLD and obesity, presented for cellulitis, no signs of sepsis, was found to have acute DVT, LLE cellulitis and acute CHF.     # LLE Cellulitis  - CT obtained: Cellulitis, no necrotizing fascitis  - No fascitis/pyomyositis/abscess or compartment syndrome  - on Linezolid 600 mg iv q12h and Rocephin 2 gm iv q12h  - f/u nasal swab for MRSA  - 7/23: bcx and ucx ngtd  - keep LE elevated   - start topical antifungal cream to toe nails     #LLE pain 2/2 Extensive DVT   - consulted by vascular surgery, no intervention recommended   - started on Eliquis 10 mg BID   - ACE wrap to LE, elevated LE   - DVT likely provoked     #Bilateral dystrophic toenails consistent with Onychomycosis  - podiatry consulted, rec appreciated  - patient refused debridement of toenails  - f/u podiatry outpatient    # Acute on chronic suspected diastolic CHF/ LAITH/ morbid obesity/ OHS  - fluid restriction 1200 ml in 24 hours  - intake and output monitoring, daily weight   - increased IV Lasix 40 mg to Q 12 hours,  keep negative balance  - TTE noted  - encourage compliance with BIPAP     # h/o CAD/ MI  - on ASA, statin, BB   - OP f/u with Dr Seaman      52 yo m hx of copd (On 2.5L/min O2 PRN at home), CAD (hx of MI in April 2021), LAITH (On CPAP), HLD and obesity, presented for cellulitis, no signs of sepsis, was found to have acute DVT, LLE cellulitis and acute CHF.     # LLE Cellulitis  - CT obtained: Cellulitis, no necrotizing fascitis  - No fascitis/pyomyositis/abscess or compartment syndrome  - on Linezolid 600 mg iv q12h and Rocephin 2 gm iv q12h - will start po vantin and doxycycline on discharge  - bcx and ucx ngtd  - keep LE elevated   - start topical antifungal cream to toe nails     #LLE pain 2/2 Extensive DVT   - duplex obtained, confirmed acute DVT  - consulted by vascular surgery, no intervention recommended   - started on Eliquis 10 mg BID   - ACE wrap to LE, elevated LE     #Bilateral dystrophic toenails consistent with Onychomycosis  - podiatry consulted, rec appreciated  - patient refused debridement of toenails  - f/u podiatry outpatient    # Acute on chronic suspected diastolic CHF/ LAITH/ morbid obesity/ OHS  - fluid restriction 1200 ml in 24 hours  - intake and output monitoring, daily weight   - increased IV Lasix 40 mg to Q 12 hours,  keep negative balance  - TTE noted  - encourage compliance with BIPAP     # h/o CAD/ MI  - on ASA, statin, BB   - OP f/u with Dr Seaman

## 2023-07-24 NOTE — PROGRESS NOTE ADULT - SUBJECTIVE AND OBJECTIVE BOX
53y old Male who presents with a chief complaint of Left lower limb swelling and redness, he was found to have a soft tissue infection in LLE and extensive LLE DVT, consulted by vascular surgery, no intervention recommended, pt was started on Eliquis 10 mg Q 12 hours on 7/21. He was consulted by ID, on broad spectrum ABx now. Pt is morbidly obese, has h/o CHF, LAITH, gain a lot of weight recently, on IV LAsix now, dose was changed today to 40 MG Q 12 hours and BIPAP QHS.  Today pt feels better, LLE swelling is slowly resolving, pt is ambulating, unable to keep ACE wrap on due to pain, he is ambulating, asking about discharge.     PAST MEDICAL & SURGICAL HISTORY  COPD (chronic obstructive pulmonary disease)    Anxiety    CAD (coronary artery disease)    LAITH on CPAP    HLD (hyperlipidemia)    History of knee replacement    History of cholecystectomy    S/P flap graft      SOCIAL HISTORY:  Negative for smoking/alcohol/drug use.     ALLERGIES:  penicillins (Other)    VITALS:   T(C): 36.7 (24 Jul 2023 05:02), Max: 36.7 (23 Jul 2023 20:32)  T(F): 98 (24 Jul 2023 05:02), Max: 98.1 (23 Jul 2023 20:32)  HR: 80 (24 Jul 2023 13:42) (80 - 93)  BP: 103/52 (24 Jul 2023 13:42) (103/52 - 109/52)  BP(mean): --  RR: 18 (24 Jul 2023 13:42) (18 - 18)      PHYSICAL EXAM:  GEN: No acute distress, morbidly obese, pleasant   NECK: supple, wide neck   LUNGS: distant BS, shallow breathing    HEART: Regular, S1, S1   ABD: obese,  Soft, non-tender, non-distended, BS present   EXT: 3 + edema on LLE edema with a large area of induration and erythema above the left knee all the way up to the groin.   NEURO: AAOX3, moving all extremities, no focal neuro deficit       LABS:                                   15.0   10.94 )-----------( 213      ( 24 Jul 2023 07:58 )             46.0   07-24    136  |  87<L>  |  19  ----------------------------<  196<H>  3.9   |  38<H>  |  1.1    Ca    9.2      24 Jul 2023 07:58  Mg     2.0     07-24    TPro  7.1  /  Alb  3.8  /  TBili  0.5  /  DBili  x   /  AST  31  /  ALT  47<H>  /  AlkPhos  99  07-24        Urinalysis Basic - ( 20 Jul 2023 04:20 )    Color: x / Appearance: x / SG: x / pH: x  Gluc: 181 mg/dL / Ketone: x  / Bili: x / Urobili: x   Blood: x / Protein: x / Nitrite: x   Leuk Esterase: x / RBC: x / WBC x   Sq Epi: x / Non Sq Epi: x / Bacteria: x    Culture - Urine (07.20.23 @ 13:09)   Specimen Source: Clean Catch Clean Catch (Midstream)  Culture Results:   No growthCulture - Blood (07.20.23 @ 04:20)   Specimen Source: .Blood Blood-Peripheral  Culture Results:   No growth at 72 Hours  RADIOLOGY:    < from: Xray Chest 1 View- PORTABLE-Routine (Xray Chest 1 View- PORTABLE-Routine .) (07.21.23 @ 13:38) >  Impression:    Cardiomegaly with mild CHF.    < end of copied text >  < from: [facility] Duplex Lower Ext Vein Scan, Bilat (07.20.23 @ 10:07) >    Impression:    No evidence of deep venous thrombosis or superficial thrombophlebitis in   the right lower extremity.    Acute appearing deep venous thrombosis of the left common femoral,   femoral and popliteal veins. Superficial thrombophlebitis of the left   greater saphenous vein.    < end of copied text >  < from: CT Lower Extremity w/ IV Cont, Left (07.20.23 @ 05:06) >  IMPRESSION:    1.  No evidence for soft tissue gas, abscess, abnormal differential   enhancement or osteomyelitis is identified.  2.  Subcutaneous fat infiltration and edema of the entire left lower   extremity. Correlate for cellulitis.    < from: TTE Echo Complete w/ Contrast w/ Doppler (07.24.23 @ 10:23) >  Summary:   1. Technically difficult study.   2. Endocardial visualization was enhanced with intravenous echo contrast.   3. Left ventricular ejection fraction, by visual estimation, is >55%.   4. Normal global left ventricular systolic function.   5. Spectral Doppler shows impaired relaxation pattern of left   ventricular myocardial filling (Grade I diastolic dysfunction).   6. The right ventricle was not well visualized; function appears reduced.    < end of copied text >      MEDICATIONS  (STANDING):  albuterol/ipratropium for Nebulization 3 milliLiter(s) Nebulizer every 6 hours  apixaban 10 milliGRAM(s) Oral every 12 hours  aspirin 81 milliGRAM(s) Oral daily  atorvastatin 10 milliGRAM(s) Oral at bedtime  buDESOnide    Inhalation Suspension 0.5 milliGRAM(s) Inhalation two times a day  cefTRIAXone   IVPB 2000 milliGRAM(s) IV Intermittent every 12 hours  clotrimazole 1% Cream 1 Application(s) Topical every 12 hours  furosemide   Injectable 40 milliGRAM(s) IV Push every 12 hours  linezolid  IVPB 600 milliGRAM(s) IV Intermittent every 12 hours  losartan 25 milliGRAM(s) Oral daily  metoprolol tartrate 12.5 milliGRAM(s) Oral every 12 hours  pantoprazole    Tablet 40 milliGRAM(s) Oral before breakfast    MEDICATIONS  (PRN):  acetaminophen     Tablet .. 650 milliGRAM(s) Oral every 6 hours PRN Temp greater or equal to 38C (100.4F), Mild Pain (1 - 3)  aluminum hydroxide/magnesium hydroxide/simethicone Suspension 30 milliLiter(s) Oral every 4 hours PRN Dyspepsia  HYDROmorphone  Injectable 1 milliGRAM(s) IV Push every 6 hours PRN Severe Pain (7 - 10)  melatonin 3 milliGRAM(s) Oral at bedtime PRN Insomnia  ondansetron Injectable 4 milliGRAM(s) IV Push every 8 hours PRN Nausea and/or Vomiting  oxyCODONE    IR 10 milliGRAM(s) Oral every 4 hours PRN Mild Pain (1 - 3)

## 2023-07-24 NOTE — PROGRESS NOTE ADULT - SUBJECTIVE AND OBJECTIVE BOX
SUBJECTIVE:    Patient is a 53y old Male who presents with a chief complaint of Left lower limb swelling and redness (23 Jul 2023 15:10)     Today is hospital day 4d. NAEON. Patient endorsed improved movement, continue to complain of pain. On physical exam, LLE was swollen red, not not warm.       PAST MEDICAL & SURGICAL HISTORY  COPD (chronic obstructive pulmonary disease)    Anxiety    CAD (coronary artery disease)    LAITH on CPAP    HLD (hyperlipidemia)    History of knee replacement    History of cholecystectomy    S/P flap graft      SOCIAL HISTORY:  Negative for smoking/alcohol/drug use.     ALLERGIES:  penicillins (Other)    MEDICATIONS:  STANDING MEDICATIONS  albuterol/ipratropium for Nebulization 3 milliLiter(s) Nebulizer every 6 hours  apixaban 10 milliGRAM(s) Oral every 12 hours  aspirin 81 milliGRAM(s) Oral daily  atorvastatin 10 milliGRAM(s) Oral at bedtime  buDESOnide    Inhalation Suspension 0.5 milliGRAM(s) Inhalation two times a day  cefTRIAXone   IVPB 2000 milliGRAM(s) IV Intermittent every 12 hours  clotrimazole 1% Cream 1 Application(s) Topical every 12 hours  furosemide   Injectable 40 milliGRAM(s) IV Push every 12 hours  linezolid  IVPB 600 milliGRAM(s) IV Intermittent every 12 hours  losartan 25 milliGRAM(s) Oral daily  metoprolol tartrate 12.5 milliGRAM(s) Oral every 12 hours  pantoprazole    Tablet 40 milliGRAM(s) Oral before breakfast    PRN MEDICATIONS  acetaminophen     Tablet .. 650 milliGRAM(s) Oral every 6 hours PRN  aluminum hydroxide/magnesium hydroxide/simethicone Suspension 30 milliLiter(s) Oral every 4 hours PRN  HYDROmorphone  Injectable 1 milliGRAM(s) IV Push every 6 hours PRN  melatonin 3 milliGRAM(s) Oral at bedtime PRN  ondansetron Injectable 4 milliGRAM(s) IV Push every 8 hours PRN  oxyCODONE    IR 10 milliGRAM(s) Oral every 4 hours PRN    VITALS:   T(F): 98  HR: 93  BP: 109/52  RR: 18  SpO2: --    LABS:                        15.1   11.56 )-----------( 217      ( 23 Jul 2023 07:47 )             46.3     07-23    138  |  91<L>  |  15  ----------------------------<  189<H>  4.0   |  38<H>  |  0.9    Ca    9.0      23 Jul 2023 07:47  Mg     2.0     07-23    TPro  7.1  /  Alb  3.8  /  TBili  0.5  /  DBili  x   /  AST  36  /  ALT  45<H>  /  AlkPhos  102  07-23      Urinalysis Basic - ( 23 Jul 2023 07:47 )    Color: x / Appearance: x / SG: x / pH: x  Gluc: 189 mg/dL / Ketone: x  / Bili: x / Urobili: x   Blood: x / Protein: x / Nitrite: x   Leuk Esterase: x / RBC: x / WBC x   Sq Epi: x / Non Sq Epi: x / Bacteria: x      RADIOLOGY:    PHYSICAL EXAM:  GEN: No acute distress  LUNGS: Clear to auscultation bilaterally   HEART: Regular  ABD: Soft, non-tender, non-distended.  EXT: NC/NC/NE/2+PP/IBANEZ/Skin Intact.   NEURO: AAOX3    Intravenous access:   NG tube:   Celeste Catheter:

## 2023-07-24 NOTE — DISCHARGE NOTE PROVIDER - NSDCMRMEDTOKEN_GEN_ALL_CORE_FT
apixaban 5 mg oral tablet: 2 tab(s) orally every 12 hours for 6 days then 1 tablet every 12 hours daily  aspirin 81 mg oral capsule: 1 orally once a day  atorvastatin 10 mg oral tablet: 1 tab(s) orally once a day  DuoNeb 0.5 mg-2.5 mg/3 mL inhalation solution: 3 milliliter(s) inhaled 4 times a day, As Needed  Effer-K 10 mEq oral tablet, effervescent: 1 tab(s) orally once a day  furosemide 40 mg oral tablet: 1 tab(s) orally once a day  losartan 25 mg oral tablet: 1 tab(s) orally once a day  Metoprolol Tartrate 25 mg oral tablet: 1.5 tab(s) orally once a day  Ventolin 90 mcg/inh inhalation aerosol: 1 puff(s) inhaled 4 times a day as needed for  shortness of breath and/or wheezing   apixaban 5 mg oral tablet: 2 tab(s) orally every 12 hours for 6 days then 1 tablet every 12 hours daily  aspirin 81 mg oral capsule: 1 orally once a day  atorvastatin 10 mg oral tablet: 1 tab(s) orally once a day  cefpodoxime 200 mg oral tablet: 1 tab(s) orally 2 times a day end 8/4  doxycycline monohydrate 100 mg oral tablet: 1 tab(s) orally 2 times a day end 8/4  DuoNeb 0.5 mg-2.5 mg/3 mL inhalation solution: 3 milliliter(s) inhaled 4 times a day, As Needed  Effer-K 10 mEq oral tablet, effervescent: 1 tab(s) orally once a day  furosemide 40 mg oral tablet: 1 tab(s) orally once a day  losartan 25 mg oral tablet: 1 tab(s) orally once a day  Metoprolol Tartrate 25 mg oral tablet: 1.5 tab(s) orally once a day  Ventolin 90 mcg/inh inhalation aerosol: 1 puff(s) inhaled 4 times a day as needed for  shortness of breath and/or wheezing

## 2023-07-24 NOTE — PROGRESS NOTE ADULT - SUBJECTIVE AND OBJECTIVE BOX
SHANKAR HECTOR  53y, Male    All available historical data reviewed    OVERNIGHT EVENTS:    no fevers  no pain LLE , has pruritus  ROS:  General: Denies rigors, nightsweats  HEENT: Denies headache, rhinorrhea, sore throat, eye pain  CV: Denies CP, palpitations  PULM: Denies wheezing, hemoptysis  GI: Denies hematemesis, hematochezia, melena  : Denies discharge, hematuria  MSK: Denies arthralgias, myalgias  SKIN: Denies rash, lesions  NEURO: Denies paresthesias, weakness  PSYCH: Denies depression, anxiety    VITALS:  T(F): 98, Max: 98.1 (07-23-23 @ 20:32)  HR: 93  BP: 109/52  RR: 18Vital Signs Last 24 Hrs  T(C): 36.7 (24 Jul 2023 05:02), Max: 36.7 (23 Jul 2023 20:32)  T(F): 98 (24 Jul 2023 05:02), Max: 98.1 (23 Jul 2023 20:32)  HR: 93 (24 Jul 2023 05:02) (82 - 93)  BP: 109/52 (24 Jul 2023 05:02) (104/54 - 109/52)  BP(mean): --  RR: 18 (24 Jul 2023 05:02) (18 - 18)  SpO2: --        TESTS & MEASUREMENTS:                        15.1   11.56 )-----------( 217      ( 23 Jul 2023 07:47 )             46.3     07-23    138  |  91<L>  |  15  ----------------------------<  189<H>  4.0   |  38<H>  |  0.9    Ca    9.0      23 Jul 2023 07:47  Mg     2.0     07-23    TPro  7.1  /  Alb  3.8  /  TBili  0.5  /  DBili  x   /  AST  36  /  ALT  45<H>  /  AlkPhos  102  07-23    LIVER FUNCTIONS - ( 23 Jul 2023 07:47 )  Alb: 3.8 g/dL / Pro: 7.1 g/dL / ALK PHOS: 102 U/L / ALT: 45 U/L / AST: 36 U/L / GGT: x             Culture - Urine (collected 07-20-23 @ 13:09)  Source: Clean Catch Clean Catch (Midstream)  Final Report (07-21-23 @ 21:17):    No growth    Culture - Blood (collected 07-20-23 @ 04:20)  Source: .Blood Blood-Peripheral  Preliminary Report (07-23-23 @ 15:01):    No growth at 72 Hours    Culture - Blood (collected 07-20-23 @ 04:20)  Source: .Blood Blood-Peripheral  Preliminary Report (07-23-23 @ 15:01):    No growth at 72 Hours      Urinalysis Basic - ( 23 Jul 2023 07:47 )    Color: x / Appearance: x / SG: x / pH: x  Gluc: 189 mg/dL / Ketone: x  / Bili: x / Urobili: x   Blood: x / Protein: x / Nitrite: x   Leuk Esterase: x / RBC: x / WBC x   Sq Epi: x / Non Sq Epi: x / Bacteria: x          RADIOLOGY & ADDITIONAL TESTS:  Personal review of radiological diagnostics performed  Echo and EKG results noted when applicable.     MEDICATIONS:  acetaminophen     Tablet .. 650 milliGRAM(s) Oral every 6 hours PRN  albuterol/ipratropium for Nebulization 3 milliLiter(s) Nebulizer every 6 hours  aluminum hydroxide/magnesium hydroxide/simethicone Suspension 30 milliLiter(s) Oral every 4 hours PRN  apixaban 10 milliGRAM(s) Oral every 12 hours  aspirin 81 milliGRAM(s) Oral daily  atorvastatin 10 milliGRAM(s) Oral at bedtime  buDESOnide    Inhalation Suspension 0.5 milliGRAM(s) Inhalation two times a day  cefTRIAXone   IVPB 2000 milliGRAM(s) IV Intermittent every 12 hours  clotrimazole 1% Cream 1 Application(s) Topical every 12 hours  furosemide   Injectable 40 milliGRAM(s) IV Push every 12 hours  HYDROmorphone  Injectable 1 milliGRAM(s) IV Push every 6 hours PRN  linezolid  IVPB 600 milliGRAM(s) IV Intermittent every 12 hours  losartan 25 milliGRAM(s) Oral daily  melatonin 3 milliGRAM(s) Oral at bedtime PRN  metoprolol tartrate 12.5 milliGRAM(s) Oral every 12 hours  ondansetron Injectable 4 milliGRAM(s) IV Push every 8 hours PRN  oxyCODONE    IR 10 milliGRAM(s) Oral every 4 hours PRN  pantoprazole    Tablet 40 milliGRAM(s) Oral before breakfast      ANTIBIOTICS:  cefTRIAXone   IVPB 2000 milliGRAM(s) IV Intermittent every 12 hours  linezolid  IVPB 600 milliGRAM(s) IV Intermittent every 12 hours

## 2023-07-24 NOTE — DISCHARGE NOTE PROVIDER - CARE PROVIDERS DIRECT ADDRESSES
,DirectAddress_Unknown,[email or web address],DirectAddress_Unknown ,DirectAddress_Unknown,[email or web address],DirectAddress_Unknown,[email or web address]

## 2023-07-24 NOTE — PROGRESS NOTE ADULT - ADDITIONAL PE
LLE : stll with edema, no pain on palpation, erythema resolving. Mostly limited to popliteal area Crescentic Advancement Flap Text: The defect edges were debeveled with a #15 scalpel blade.  Given the location of the defect and the proximity to free margins a crescentic advancement flap was deemed most appropriate.  Using a sterile surgical marker, the appropriate advancement flap was drawn incorporating the defect and placing the expected incisions within the relaxed skin tension lines where possible.    The area thus outlined was incised deep to adipose tissue with a #15 scalpel blade.  The skin margins were undermined to an appropriate distance in all directions utilizing iris scissors.

## 2023-07-24 NOTE — PROGRESS NOTE ADULT - ASSESSMENT
54 yo m hx of copd (On 2.5L/min O2 PRN at home), CAD (hx of MI in April 2021), LAITH (On CPAP), HLD and obesity, c/o pain with redness and swelling to LLE, History goes back to 1 week prior to presentation when patient complained of Left foot pain and swelling which resolved after massaging the foot. The second day, the swelling and the pain extended to the calf and then to the upper thigh 3 days later. Patient declines any recent scratch, insect bite, or any new lesion before symptoms began, He has no fever, no chills, no chest pain, no diarrhea, no new cough. He has no recent travel, no recent antibiotics uses.      IMPRESSION/RECOMMENDATIONS   < from: [facility] Duplex Lower Ext Vein Scan, Bilat (07.20.23 @ 10:07) >  Acute appearing deep venous thrombosis of the left common femoral,   femoral and popliteal veins. Superficial thrombophlebitis of the left   greater saphenous vein.    < end of copied text >    7/20 CT lower extremity: Subcutaneous fat infiltration and edema of the entire left lower extremity, correlated to cellulitis. No evidence of necrotizing fasciitis. Trace knee joint effusion     Major pathology is not seem infectious rather is secondary to acute venous thrombosis of the L CFV/L Popliteal vein with extensive surrounding inflammation  No fascitis/pyomyositis/abscess.  No compartment syndrome  Vascular Sx note reviewed  Overall significantly improved on anticoagulation  7/20 BCx NG  WBC 11.5  -Linezolid 600 mg iv q12h  -Rocephin 2 gm iv q12h  -could change today iv to po vantin 200 mg q12h ( I am aware of PCN allergies ) and po Doxycycline 100 mg q12h till 8/4    Please do not hesitate to recall ID if any questions arise either through my [employer] or through [employer]

## 2023-07-25 ENCOUNTER — TRANSCRIPTION ENCOUNTER (OUTPATIENT)
Age: 53
End: 2023-07-25

## 2023-07-25 VITALS
DIASTOLIC BLOOD PRESSURE: 57 MMHG | TEMPERATURE: 99 F | HEART RATE: 97 BPM | SYSTOLIC BLOOD PRESSURE: 100 MMHG | RESPIRATION RATE: 18 BRPM

## 2023-07-25 LAB
ALBUMIN SERPL ELPH-MCNC: 3.9 G/DL — SIGNIFICANT CHANGE UP (ref 3.5–5.2)
ALP SERPL-CCNC: 101 U/L — SIGNIFICANT CHANGE UP (ref 30–115)
ALT FLD-CCNC: 45 U/L — HIGH (ref 0–41)
ANION GAP SERPL CALC-SCNC: 17 MMOL/L — HIGH (ref 7–14)
AST SERPL-CCNC: 26 U/L — SIGNIFICANT CHANGE UP (ref 0–41)
BASOPHILS # BLD AUTO: 0.06 K/UL — SIGNIFICANT CHANGE UP (ref 0–0.2)
BASOPHILS NFR BLD AUTO: 0.5 % — SIGNIFICANT CHANGE UP (ref 0–1)
BILIRUB SERPL-MCNC: 0.4 MG/DL — SIGNIFICANT CHANGE UP (ref 0.2–1.2)
BUN SERPL-MCNC: 26 MG/DL — HIGH (ref 10–20)
CALCIUM SERPL-MCNC: 9.1 MG/DL — SIGNIFICANT CHANGE UP (ref 8.4–10.5)
CHLORIDE SERPL-SCNC: 87 MMOL/L — LOW (ref 98–110)
CO2 SERPL-SCNC: 33 MMOL/L — HIGH (ref 17–32)
CREAT SERPL-MCNC: 1.3 MG/DL — SIGNIFICANT CHANGE UP (ref 0.7–1.5)
CULTURE RESULTS: SIGNIFICANT CHANGE UP
CULTURE RESULTS: SIGNIFICANT CHANGE UP
EGFR: 65 ML/MIN/1.73M2 — SIGNIFICANT CHANGE UP
EOSINOPHIL # BLD AUTO: 0.34 K/UL — SIGNIFICANT CHANGE UP (ref 0–0.7)
EOSINOPHIL NFR BLD AUTO: 2.6 % — SIGNIFICANT CHANGE UP (ref 0–8)
GLUCOSE SERPL-MCNC: 200 MG/DL — HIGH (ref 70–99)
HCT VFR BLD CALC: 48 % — SIGNIFICANT CHANGE UP (ref 42–52)
HGB BLD-MCNC: 15.7 G/DL — SIGNIFICANT CHANGE UP (ref 14–18)
IMM GRANULOCYTES NFR BLD AUTO: 0.5 % — HIGH (ref 0.1–0.3)
LYMPHOCYTES # BLD AUTO: 1.94 K/UL — SIGNIFICANT CHANGE UP (ref 1.2–3.4)
LYMPHOCYTES # BLD AUTO: 14.8 % — LOW (ref 20.5–51.1)
MAGNESIUM SERPL-MCNC: 2.1 MG/DL — SIGNIFICANT CHANGE UP (ref 1.8–2.4)
MCHC RBC-ENTMCNC: 31.2 PG — HIGH (ref 27–31)
MCHC RBC-ENTMCNC: 32.7 G/DL — SIGNIFICANT CHANGE UP (ref 32–37)
MCV RBC AUTO: 95.4 FL — HIGH (ref 80–94)
MONOCYTES # BLD AUTO: 0.85 K/UL — HIGH (ref 0.1–0.6)
MONOCYTES NFR BLD AUTO: 6.5 % — SIGNIFICANT CHANGE UP (ref 1.7–9.3)
NEUTROPHILS # BLD AUTO: 9.87 K/UL — HIGH (ref 1.4–6.5)
NEUTROPHILS NFR BLD AUTO: 75.1 % — SIGNIFICANT CHANGE UP (ref 42.2–75.2)
NRBC # BLD: 0 /100 WBCS — SIGNIFICANT CHANGE UP (ref 0–0)
PLATELET # BLD AUTO: 264 K/UL — SIGNIFICANT CHANGE UP (ref 130–400)
PMV BLD: 10.6 FL — HIGH (ref 7.4–10.4)
POTASSIUM SERPL-MCNC: 4.2 MMOL/L — SIGNIFICANT CHANGE UP (ref 3.5–5)
POTASSIUM SERPL-SCNC: 4.2 MMOL/L — SIGNIFICANT CHANGE UP (ref 3.5–5)
PROT SERPL-MCNC: 7.5 G/DL — SIGNIFICANT CHANGE UP (ref 6–8)
RBC # BLD: 5.03 M/UL — SIGNIFICANT CHANGE UP (ref 4.7–6.1)
RBC # FLD: 12.4 % — SIGNIFICANT CHANGE UP (ref 11.5–14.5)
SODIUM SERPL-SCNC: 137 MMOL/L — SIGNIFICANT CHANGE UP (ref 135–146)
SPECIMEN SOURCE: SIGNIFICANT CHANGE UP
SPECIMEN SOURCE: SIGNIFICANT CHANGE UP
WBC # BLD: 13.12 K/UL — HIGH (ref 4.8–10.8)
WBC # FLD AUTO: 13.12 K/UL — HIGH (ref 4.8–10.8)

## 2023-07-25 PROCEDURE — 99239 HOSP IP/OBS DSCHRG MGMT >30: CPT

## 2023-07-25 RX ORDER — CEFPODOXIME PROXETIL 100 MG
1 TABLET ORAL
Qty: 20 | Refills: 0
Start: 2023-07-25 | End: 2023-08-03

## 2023-07-25 RX ORDER — FUROSEMIDE 40 MG
60 TABLET ORAL DAILY
Refills: 0 | Status: DISCONTINUED | OUTPATIENT
Start: 2023-07-26 | End: 2023-07-25

## 2023-07-25 RX ORDER — ASPIRIN/CALCIUM CARB/MAGNESIUM 324 MG
1 TABLET ORAL
Refills: 0 | DISCHARGE

## 2023-07-25 RX ORDER — ASPIRIN/CALCIUM CARB/MAGNESIUM 324 MG
1 TABLET ORAL
Qty: 30 | Refills: 0
Start: 2023-07-25

## 2023-07-25 RX ORDER — OXYCODONE AND ACETAMINOPHEN 5; 325 MG/1; MG/1
1 TABLET ORAL
Qty: 20 | Refills: 0
Start: 2023-07-25 | End: 2023-07-29

## 2023-07-25 RX ORDER — FUROSEMIDE 40 MG
1.5 TABLET ORAL
Qty: 0 | Refills: 0 | DISCHARGE

## 2023-07-25 RX ADMIN — Medication 40 MILLIGRAM(S): at 05:43

## 2023-07-25 RX ADMIN — Medication 3 MILLILITER(S): at 14:08

## 2023-07-25 RX ADMIN — PANTOPRAZOLE SODIUM 40 MILLIGRAM(S): 20 TABLET, DELAYED RELEASE ORAL at 05:43

## 2023-07-25 RX ADMIN — HYDROMORPHONE HYDROCHLORIDE 1 MILLIGRAM(S): 2 INJECTION INTRAMUSCULAR; INTRAVENOUS; SUBCUTANEOUS at 14:09

## 2023-07-25 RX ADMIN — Medication 12.5 MILLIGRAM(S): at 05:44

## 2023-07-25 RX ADMIN — CEFTRIAXONE 100 MILLIGRAM(S): 500 INJECTION, POWDER, FOR SOLUTION INTRAMUSCULAR; INTRAVENOUS at 05:48

## 2023-07-25 RX ADMIN — Medication 81 MILLIGRAM(S): at 12:14

## 2023-07-25 RX ADMIN — Medication 1 APPLICATION(S): at 05:44

## 2023-07-25 RX ADMIN — HYDROMORPHONE HYDROCHLORIDE 1 MILLIGRAM(S): 2 INJECTION INTRAMUSCULAR; INTRAVENOUS; SUBCUTANEOUS at 05:45

## 2023-07-25 RX ADMIN — HYDROMORPHONE HYDROCHLORIDE 1 MILLIGRAM(S): 2 INJECTION INTRAMUSCULAR; INTRAVENOUS; SUBCUTANEOUS at 06:15

## 2023-07-25 RX ADMIN — LINEZOLID 300 MILLIGRAM(S): 600 INJECTION, SOLUTION INTRAVENOUS at 06:21

## 2023-07-25 RX ADMIN — APIXABAN 10 MILLIGRAM(S): 2.5 TABLET, FILM COATED ORAL at 05:43

## 2023-07-25 RX ADMIN — APIXABAN 10 MILLIGRAM(S): 2.5 TABLET, FILM COATED ORAL at 17:53

## 2023-07-25 RX ADMIN — LOSARTAN POTASSIUM 25 MILLIGRAM(S): 100 TABLET, FILM COATED ORAL at 05:43

## 2023-07-25 RX ADMIN — HYDROMORPHONE HYDROCHLORIDE 1 MILLIGRAM(S): 2 INJECTION INTRAMUSCULAR; INTRAVENOUS; SUBCUTANEOUS at 14:45

## 2023-07-25 NOTE — PROGRESS NOTE ADULT - SUBJECTIVE AND OBJECTIVE BOX
53y old Male who presents with a chief complaint of Left lower limb swelling and redness, he was found to have a soft tissue infection in LLE and extensive LLE DVT, consulted by vascular surgery, no intervention recommended, pt was started on Eliquis 10 mg Q 12 hours on 7/21. He was consulted by ID, on broad spectrum ABx now. Pt is morbidly obese, has h/o CHF, LAITH, gain a lot of weight recently, on IV LAsix now, dose was changed today to 40 MG Q 12 hours and BIPAP QHS.  Today pt feels better, has left LE edema, asking about discharge.     PAST MEDICAL & SURGICAL HISTORY  COPD (chronic obstructive pulmonary disease)    Anxiety    CAD (coronary artery disease)    LAITH on CPAP    HLD (hyperlipidemia)    History of knee replacement    History of cholecystectomy    S/P flap graft      SOCIAL HISTORY:  Negative for smoking/alcohol/drug use.     ALLERGIES:  penicillins (Other)    VITALS:   T(C): 36.6 (25 Jul 2023 05:21), Max: 37.1 (24 Jul 2023 21:00)  T(F): 97.9 (25 Jul 2023 05:21), Max: 98.8 (24 Jul 2023 21:00)  HR: 88 (25 Jul 2023 05:21) (78 - 88)  BP: 112/58 (25 Jul 2023 05:21) (103/52 - 112/58)  BP(mean): --  RR: 18 (25 Jul 2023 05:21) (18 - 18)      PHYSICAL EXAM:  GEN: No acute distress, morbidly obese, pleasant   NECK: supple, wide neck   LUNGS: distant BS, shallow breathing    HEART: Regular, S1, S1   ABD: obese,  Soft, non-tender, non-distended, BS present   EXT: 3 + edema on LLE edema with a large area of induration and erythema above the left knee all the way up to the groin.   NEURO: AAOX3, moving all extremities, no focal neuro deficit       LABS:                                   15.7   13.12 )-----------( 264      ( 25 Jul 2023 06:47 )             48.0   07-25    137  |  87<L>  |  26<H>  ----------------------------<  200<H>  4.2   |  33<H>  |  1.3    Ca    9.1      25 Jul 2023 06:47  Mg     2.1     07-25    TPro  7.5  /  Alb  3.9  /  TBili  0.4  /  DBili  x   /  AST  26  /  ALT  45<H>  /  AlkPhos  101  07-25      Urinalysis Basic - ( 20 Jul 2023 04:20 )    Color: x / Appearance: x / SG: x / pH: x  Gluc: 181 mg/dL / Ketone: x  / Bili: x / Urobili: x   Blood: x / Protein: x / Nitrite: x   Leuk Esterase: x / RBC: x / WBC x   Sq Epi: x / Non Sq Epi: x / Bacteria: x    Culture - Urine (07.20.23 @ 13:09)   Specimen Source: Clean Catch Clean Catch (Midstream)  Culture Results:   No growthCulture - Blood (07.20.23 @ 04:20)   Specimen Source: .Blood Blood-Peripheral  Culture Results:   No growth at 72 Hours  RADIOLOGY:    < from: Xray Chest 1 View- PORTABLE-Routine (Xray Chest 1 View- PORTABLE-Routine .) (07.21.23 @ 13:38) >  Impression:    Cardiomegaly with mild CHF.    < end of copied text >  < from: [facility] Duplex Lower Ext Vein Scan, Bilat (07.20.23 @ 10:07) >    Impression:    No evidence of deep venous thrombosis or superficial thrombophlebitis in   the right lower extremity.    Acute appearing deep venous thrombosis of the left common femoral,   femoral and popliteal veins. Superficial thrombophlebitis of the left   greater saphenous vein.    < end of copied text >  < from: CT Lower Extremity w/ IV Cont, Left (07.20.23 @ 05:06) >  IMPRESSION:    1.  No evidence for soft tissue gas, abscess, abnormal differential   enhancement or osteomyelitis is identified.  2.  Subcutaneous fat infiltration and edema of the entire left lower   extremity. Correlate for cellulitis.    < from: TTE Echo Complete w/ Contrast w/ Doppler (07.24.23 @ 10:23) >  Summary:   1. Technically difficult study.   2. Endocardial visualization was enhanced with intravenous echo contrast.   3. Left ventricular ejection fraction, by visual estimation, is >55%.   4. Normal global left ventricular systolic function.   5. Spectral Doppler shows impaired relaxation pattern of left   ventricular myocardial filling (Grade I diastolic dysfunction).   6. The right ventricle was not well visualized; function appears reduced.    < end of copied text >      MEDICATIONS  (STANDING):  albuterol/ipratropium for Nebulization 3 milliLiter(s) Nebulizer every 6 hours  apixaban 10 milliGRAM(s) Oral every 12 hours  aspirin 81 milliGRAM(s) Oral daily  atorvastatin 10 milliGRAM(s) Oral at bedtime  buDESOnide    Inhalation Suspension 0.5 milliGRAM(s) Inhalation two times a day  cefTRIAXone   IVPB 2000 milliGRAM(s) IV Intermittent every 12 hours  clotrimazole 1% Cream 1 Application(s) Topical every 12 hours  linezolid  IVPB 600 milliGRAM(s) IV Intermittent every 12 hours  losartan 25 milliGRAM(s) Oral daily  metoprolol tartrate 12.5 milliGRAM(s) Oral every 12 hours  pantoprazole    Tablet 40 milliGRAM(s) Oral before breakfast    MEDICATIONS  (PRN):  acetaminophen     Tablet .. 650 milliGRAM(s) Oral every 6 hours PRN Temp greater or equal to 38C (100.4F), Mild Pain (1 - 3)  aluminum hydroxide/magnesium hydroxide/simethicone Suspension 30 milliLiter(s) Oral every 4 hours PRN Dyspepsia  HYDROmorphone  Injectable 1 milliGRAM(s) IV Push every 6 hours PRN Severe Pain (7 - 10)  melatonin 3 milliGRAM(s) Oral at bedtime PRN Insomnia  ondansetron Injectable 4 milliGRAM(s) IV Push every 8 hours PRN Nausea and/or Vomiting  oxyCODONE    IR 10 milliGRAM(s) Oral every 4 hours PRN Mild Pain (1 - 3)

## 2023-07-25 NOTE — PROGRESS NOTE ADULT - SUBJECTIVE AND OBJECTIVE BOX
SUBJECTIVE:    Patient is a 53y old Male who presents with a chief complaint of Left lower limb swelling and redness (24 Jul 2023 14:20)    Currently admitted to medicine with the primary diagnosis of Cellulitis of left lower leg       Today is hospital day 5d. This morning he is resting comfortably in bed and reports no new issues or overnight events.     PAST MEDICAL & SURGICAL HISTORY  COPD (chronic obstructive pulmonary disease)    Anxiety    CAD (coronary artery disease)    LAITH on CPAP    HLD (hyperlipidemia)    History of knee replacement    History of cholecystectomy    S/P flap graft      SOCIAL HISTORY:  Negative for smoking/alcohol/drug use.     ALLERGIES:  penicillins (Other)    MEDICATIONS:  STANDING MEDICATIONS  albuterol/ipratropium for Nebulization 3 milliLiter(s) Nebulizer every 6 hours  apixaban 10 milliGRAM(s) Oral every 12 hours  aspirin 81 milliGRAM(s) Oral daily  atorvastatin 10 milliGRAM(s) Oral at bedtime  buDESOnide    Inhalation Suspension 0.5 milliGRAM(s) Inhalation two times a day  cefTRIAXone   IVPB 2000 milliGRAM(s) IV Intermittent every 12 hours  clotrimazole 1% Cream 1 Application(s) Topical every 12 hours  furosemide   Injectable 40 milliGRAM(s) IV Push every 12 hours  linezolid  IVPB 600 milliGRAM(s) IV Intermittent every 12 hours  losartan 25 milliGRAM(s) Oral daily  metoprolol tartrate 12.5 milliGRAM(s) Oral every 12 hours  pantoprazole    Tablet 40 milliGRAM(s) Oral before breakfast    PRN MEDICATIONS  acetaminophen     Tablet .. 650 milliGRAM(s) Oral every 6 hours PRN  aluminum hydroxide/magnesium hydroxide/simethicone Suspension 30 milliLiter(s) Oral every 4 hours PRN  HYDROmorphone  Injectable 1 milliGRAM(s) IV Push every 6 hours PRN  melatonin 3 milliGRAM(s) Oral at bedtime PRN  ondansetron Injectable 4 milliGRAM(s) IV Push every 8 hours PRN  oxyCODONE    IR 10 milliGRAM(s) Oral every 4 hours PRN    VITALS:   T(F): 97.9  HR: 88  BP: 112/58  RR: 18  SpO2: --    LABS:                        15.0   10.94 )-----------( 213      ( 24 Jul 2023 07:58 )             46.0     07-24    136  |  87<L>  |  19  ----------------------------<  196<H>  3.9   |  38<H>  |  1.1    Ca    9.2      24 Jul 2023 07:58  Mg     2.0     07-24    TPro  7.1  /  Alb  3.8  /  TBili  0.5  /  DBili  x   /  AST  31  /  ALT  47<H>  /  AlkPhos  99  07-24      Urinalysis Basic - ( 24 Jul 2023 07:58 )    Color: x / Appearance: x / SG: x / pH: x  Gluc: 196 mg/dL / Ketone: x  / Bili: x / Urobili: x   Blood: x / Protein: x / Nitrite: x   Leuk Esterase: x / RBC: x / WBC x   Sq Epi: x / Non Sq Epi: x / Bacteria: x      RADIOLOGY:    PHYSICAL EXAM:  GEN: No acute distress  LUNGS: Clear to auscultation bilaterally   HEART: Regular  ABD: Soft, non-tender, non-distended.  EXT: NC/NC/NE/2+PP/IBANEZ/Skin Intact.   NEURO: AAOX3    Intravenous access:   NG tube:   Celeste Catheter:        SUBJECTIVE:    Patient is a 53y old Male who presents with a chief complaint of Left lower limb swelling and redness (24 Jul 2023 14:20)     Today is hospital day 5d. NAEON. Patient did not use bipap overnight. On RA, reported using NC intermittently. On physical, LLE is red swollen. Patient expressed strong intention to leave today.     PAST MEDICAL & SURGICAL HISTORY  COPD (chronic obstructive pulmonary disease)    Anxiety    CAD (coronary artery disease)    LAITH on CPAP    HLD (hyperlipidemia)    History of knee replacement    History of cholecystectomy    S/P flap graft      SOCIAL HISTORY:  Negative for smoking/alcohol/drug use.     ALLERGIES:  penicillins (Other)    MEDICATIONS:  STANDING MEDICATIONS  albuterol/ipratropium for Nebulization 3 milliLiter(s) Nebulizer every 6 hours  apixaban 10 milliGRAM(s) Oral every 12 hours  aspirin 81 milliGRAM(s) Oral daily  atorvastatin 10 milliGRAM(s) Oral at bedtime  buDESOnide    Inhalation Suspension 0.5 milliGRAM(s) Inhalation two times a day  cefTRIAXone   IVPB 2000 milliGRAM(s) IV Intermittent every 12 hours  clotrimazole 1% Cream 1 Application(s) Topical every 12 hours  furosemide   Injectable 40 milliGRAM(s) IV Push every 12 hours  linezolid  IVPB 600 milliGRAM(s) IV Intermittent every 12 hours  losartan 25 milliGRAM(s) Oral daily  metoprolol tartrate 12.5 milliGRAM(s) Oral every 12 hours  pantoprazole    Tablet 40 milliGRAM(s) Oral before breakfast    PRN MEDICATIONS  acetaminophen     Tablet .. 650 milliGRAM(s) Oral every 6 hours PRN  aluminum hydroxide/magnesium hydroxide/simethicone Suspension 30 milliLiter(s) Oral every 4 hours PRN  HYDROmorphone  Injectable 1 milliGRAM(s) IV Push every 6 hours PRN  melatonin 3 milliGRAM(s) Oral at bedtime PRN  ondansetron Injectable 4 milliGRAM(s) IV Push every 8 hours PRN  oxyCODONE    IR 10 milliGRAM(s) Oral every 4 hours PRN    VITALS:   T(F): 97.9  HR: 88  BP: 112/58  RR: 18  SpO2: --    LABS:                        15.0   10.94 )-----------( 213      ( 24 Jul 2023 07:58 )             46.0     07-24    136  |  87<L>  |  19  ----------------------------<  196<H>  3.9   |  38<H>  |  1.1    Ca    9.2      24 Jul 2023 07:58  Mg     2.0     07-24    TPro  7.1  /  Alb  3.8  /  TBili  0.5  /  DBili  x   /  AST  31  /  ALT  47<H>  /  AlkPhos  99  07-24      Urinalysis Basic - ( 24 Jul 2023 07:58 )    Color: x / Appearance: x / SG: x / pH: x  Gluc: 196 mg/dL / Ketone: x  / Bili: x / Urobili: x   Blood: x / Protein: x / Nitrite: x   Leuk Esterase: x / RBC: x / WBC x   Sq Epi: x / Non Sq Epi: x / Bacteria: x      RADIOLOGY:    PHYSICAL EXAM:  GEN: No acute distress  LUNGS: Clear to auscultation bilaterally   HEART: Regular  ABD: Soft, non-tender, non-distended.  EXT: NC/NC/NE/2+PP/IBANEZ/Skin Intact.   NEURO: AAOX3    Intravenous access:   NG tube:   Celeste Catheter:

## 2023-07-25 NOTE — DISCHARGE NOTE NURSING/CASE MANAGEMENT/SOCIAL WORK - PATIENT PORTAL LINK FT
You can access the FollowMyHealth Patient Portal offered by [facility] by registering at the following website: [email or web address]. By joining [facility]’s FollowMyHealth portal, you will also be able to view your health information using other applications (apps) compatible with our system.

## 2023-07-25 NOTE — DISCHARGE NOTE NURSING/CASE MANAGEMENT/SOCIAL WORK - NSDCPEFALRISK_GEN_ALL_CORE
For information on Fall & Injury Prevention, visit: [email or web address] OR  [email or web address] OR  https://www.cdc.gov/steadi/patient.html

## 2023-07-25 NOTE — PROGRESS NOTE ADULT - REASON FOR ADMISSION
Left lower limb swelling and redness

## 2023-07-25 NOTE — PROGRESS NOTE ADULT - PROVIDER SPECIALTY LIST ADULT
Internal Medicine
Hospitalist
Internal Medicine
Internal Medicine
Hospitalist
Internal Medicine
Infectious Disease
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
## 2023-08-01 DIAGNOSIS — I82.412 ACUTE EMBOLISM AND THROMBOSIS OF LEFT FEMORAL VEIN: ICD-10-CM

## 2023-08-01 DIAGNOSIS — B35.1 TINEA UNGUIUM: ICD-10-CM

## 2023-08-01 DIAGNOSIS — G47.33 OBSTRUCTIVE SLEEP APNEA (ADULT) (PEDIATRIC): ICD-10-CM

## 2023-08-01 DIAGNOSIS — L85.3 XEROSIS CUTIS: ICD-10-CM

## 2023-08-01 DIAGNOSIS — F41.9 ANXIETY DISORDER, UNSPECIFIED: ICD-10-CM

## 2023-08-01 DIAGNOSIS — M25.462 EFFUSION, LEFT KNEE: ICD-10-CM

## 2023-08-01 DIAGNOSIS — Z99.89 DEPENDENCE ON OTHER ENABLING MACHINES AND DEVICES: ICD-10-CM

## 2023-08-01 DIAGNOSIS — I25.2 OLD MYOCARDIAL INFARCTION: ICD-10-CM

## 2023-08-01 DIAGNOSIS — L03.116 CELLULITIS OF LEFT LOWER LIMB: ICD-10-CM

## 2023-08-01 DIAGNOSIS — I25.10 ATHEROSCLEROTIC HEART DISEASE OF NATIVE CORONARY ARTERY WITHOUT ANGINA PECTORIS: ICD-10-CM

## 2023-08-01 DIAGNOSIS — E78.5 HYPERLIPIDEMIA, UNSPECIFIED: ICD-10-CM

## 2023-08-01 DIAGNOSIS — E66.2 MORBID (SEVERE) OBESITY WITH ALVEOLAR HYPOVENTILATION: ICD-10-CM

## 2023-08-01 DIAGNOSIS — J44.9 CHRONIC OBSTRUCTIVE PULMONARY DISEASE, UNSPECIFIED: ICD-10-CM

## 2023-08-01 DIAGNOSIS — I82.432 ACUTE EMBOLISM AND THROMBOSIS OF LEFT POPLITEAL VEIN: ICD-10-CM

## 2023-08-01 DIAGNOSIS — Z88.0 ALLERGY STATUS TO PENICILLIN: ICD-10-CM

## 2023-08-01 DIAGNOSIS — I82.812 EMBOLISM AND THROMBOSIS OF SUPERFICIAL VEINS OF LEFT LOWER EXTREMITY: ICD-10-CM

## 2023-08-01 DIAGNOSIS — I80.02 PHLEBITIS AND THROMBOPHLEBITIS OF SUPERFICIAL VESSELS OF LEFT LOWER EXTREMITY: ICD-10-CM

## 2023-08-01 DIAGNOSIS — Z87.891 PERSONAL HISTORY OF NICOTINE DEPENDENCE: ICD-10-CM

## 2023-08-01 DIAGNOSIS — I50.33 ACUTE ON CHRONIC DIASTOLIC (CONGESTIVE) HEART FAILURE: ICD-10-CM

## 2023-08-02 PROBLEM — E78.5 HYPERLIPIDEMIA, UNSPECIFIED: Chronic | Status: ACTIVE | Noted: 2023-07-20

## 2023-08-02 PROBLEM — I25.10 ATHEROSCLEROTIC HEART DISEASE OF NATIVE CORONARY ARTERY WITHOUT ANGINA PECTORIS: Chronic | Status: ACTIVE | Noted: 2023-07-20

## 2023-08-02 PROBLEM — G47.33 OBSTRUCTIVE SLEEP APNEA (ADULT) (PEDIATRIC): Chronic | Status: ACTIVE | Noted: 2023-07-20

## 2023-08-15 ENCOUNTER — APPOINTMENT (OUTPATIENT)
Dept: BURN CARE | Facility: CLINIC | Age: 53
End: 2023-08-15
Payer: MEDICAID

## 2023-08-15 ENCOUNTER — OUTPATIENT (OUTPATIENT)
Dept: OUTPATIENT SERVICES | Facility: HOSPITAL | Age: 53
LOS: 1 days | End: 2023-08-15
Payer: MEDICAID

## 2023-08-15 VITALS — SYSTOLIC BLOOD PRESSURE: 134 MMHG | TEMPERATURE: 97.1 F | HEART RATE: 80 BPM | DIASTOLIC BLOOD PRESSURE: 82 MMHG

## 2023-08-15 DIAGNOSIS — Z98.890 OTHER SPECIFIED POSTPROCEDURAL STATES: Chronic | ICD-10-CM

## 2023-08-15 DIAGNOSIS — Z00.8 ENCOUNTER FOR OTHER GENERAL EXAMINATION: ICD-10-CM

## 2023-08-15 DIAGNOSIS — Z96.659 PRESENCE OF UNSPECIFIED ARTIFICIAL KNEE JOINT: Chronic | ICD-10-CM

## 2023-08-15 DIAGNOSIS — Z90.49 ACQUIRED ABSENCE OF OTHER SPECIFIED PARTS OF DIGESTIVE TRACT: Chronic | ICD-10-CM

## 2023-08-15 PROCEDURE — 87186 SC STD MICRODIL/AGAR DIL: CPT

## 2023-08-15 PROCEDURE — 87077 CULTURE AEROBIC IDENTIFY: CPT

## 2023-08-15 PROCEDURE — 99203 OFFICE O/P NEW LOW 30 MIN: CPT

## 2023-08-15 PROCEDURE — 87070 CULTURE OTHR SPECIMN AEROBIC: CPT

## 2023-08-15 RX ORDER — SILVER SULFADIAZINE 10 MG/G
1 CREAM TOPICAL TWICE DAILY
Qty: 400 | Refills: 0 | Status: ACTIVE | COMMUNITY
Start: 2023-08-15 | End: 1900-01-01

## 2023-08-16 ENCOUNTER — APPOINTMENT (OUTPATIENT)
Dept: VASCULAR SURGERY | Facility: CLINIC | Age: 53
End: 2023-08-16
Payer: MEDICAID

## 2023-08-16 VITALS
DIASTOLIC BLOOD PRESSURE: 81 MMHG | SYSTOLIC BLOOD PRESSURE: 131 MMHG | BODY MASS INDEX: 42.66 KG/M2 | HEIGHT: 72 IN | WEIGHT: 315 LBS

## 2023-08-16 DIAGNOSIS — M79.89 OTHER SPECIFIED SOFT TISSUE DISORDERS: ICD-10-CM

## 2023-08-16 DIAGNOSIS — I82.492 ACUTE EMBOLISM AND THROMBOSIS OF OTHER SPECIFIED DEEP VEIN OF LEFT LOWER EXTREMITY: ICD-10-CM

## 2023-08-16 DIAGNOSIS — G47.33 OBSTRUCTIVE SLEEP APNEA (ADULT) (PEDIATRIC): ICD-10-CM

## 2023-08-16 DIAGNOSIS — J44.9 CHRONIC OBSTRUCTIVE PULMONARY DISEASE, UNSPECIFIED: ICD-10-CM

## 2023-08-16 DIAGNOSIS — E78.00 PURE HYPERCHOLESTEROLEMIA, UNSPECIFIED: ICD-10-CM

## 2023-08-16 PROCEDURE — 29580 STRAPPING UNNA BOOT: CPT | Mod: LT

## 2023-08-16 PROCEDURE — 99213 OFFICE O/P EST LOW 20 MIN: CPT | Mod: 25

## 2023-08-16 RX ORDER — ASPIRIN 81 MG
81 TABLET, DELAYED RELEASE (ENTERIC COATED) ORAL
Refills: 0 | Status: ACTIVE | COMMUNITY

## 2023-08-16 RX ORDER — DOXYCYCLINE HYCLATE 100 MG/1
100 TABLET ORAL TWICE DAILY
Qty: 28 | Refills: 0 | Status: ACTIVE | COMMUNITY
Start: 2023-08-16 | End: 1900-01-01

## 2023-08-16 RX ORDER — POTASSIUM CITRATE 10 MEQ/1
10 MEQ TABLET, EXTENDED RELEASE ORAL
Refills: 0 | Status: ACTIVE | COMMUNITY

## 2023-08-16 RX ORDER — APIXABAN 5 MG/1
TABLET, FILM COATED ORAL
Refills: 0 | Status: ACTIVE | COMMUNITY
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
## 2023-08-16 NOTE — HISTORY OF PRESENT ILLNESS
[FreeTextEntry1] : The patient He has a history of varicose veins in left    legs. Now complaining of pain and heaviness in the left leg with enlarging varicose vein mostly in the left leg. veins are tender to touch and the patient complies with compression stockings for over one year. The pain resolves when the patient lies down, also developing ankle discomfort.   recent DVT in left leg CF, Femoral, popliteal also left calf 3x3cm superficial wound with cellulitis.

## 2023-08-16 NOTE — PHYSICAL EXAM
[2+] : left 2+ [Ankle Swelling (On Exam)] : present [Varicose Veins Of Lower Extremities] : not present [] : not present [FreeTextEntry1] : Left thigh calf cellulitis left calf 3x3cm clean wound, +2 edema

## 2023-08-18 LAB — BACTERIA SPEC CULT: ABNORMAL

## 2023-08-22 ENCOUNTER — APPOINTMENT (OUTPATIENT)
Dept: BURN CARE | Facility: CLINIC | Age: 53
End: 2023-08-22
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
## 2023-08-23 ENCOUNTER — APPOINTMENT (OUTPATIENT)
Dept: VASCULAR SURGERY | Facility: CLINIC | Age: 53
End: 2023-08-23
Payer: MEDICAID

## 2023-08-23 DIAGNOSIS — L97.929 VARICOSE VEINS OF LEFT LOWER EXTREMITY WITH ULCER OF UNSPECIFIED SITE: ICD-10-CM

## 2023-08-23 DIAGNOSIS — I83.029 VARICOSE VEINS OF LEFT LOWER EXTREMITY WITH ULCER OF UNSPECIFIED SITE: ICD-10-CM

## 2023-08-23 PROCEDURE — 29580 STRAPPING UNNA BOOT: CPT | Mod: LT

## 2023-08-23 NOTE — ASSESSMENT
[FreeTextEntry1] : The patient is a 53-year-old male who was seen in my office last week with left leg cellulitis status post Unna boot placement and oral antibiotics.  The patient's wound is healing well.  There is no evidence of cellulitis currently.  I recommend he continue with Unna boot therapy and I will see him back in my office in 1 week's time or sooner if any new symptoms develop

## 2023-08-23 NOTE — REASON FOR VISIT
[Follow-Up: _____] : a [unfilled] follow-up visit [FreeTextEntry1] : Left leg posterior calf dorsal ulcer status post Unna boot placement

## 2023-08-24 DIAGNOSIS — I83.029 VARICOSE VEINS OF LEFT LOWER EXTREMITY WITH ULCER OF UNSPECIFIED SITE: ICD-10-CM

## 2023-08-24 DIAGNOSIS — I83.028 VARICOSE VEINS OF LEFT LOWER EXTREMITY WITH ULCER OTHER PART OF LOWER LEG: ICD-10-CM

## 2023-08-24 DIAGNOSIS — L97.829 NON-PRESSURE CHRONIC ULCER OF OTHER PART OF LEFT LOWER LEG WITH UNSPECIFIED SEVERITY: ICD-10-CM

## 2023-08-29 ENCOUNTER — NON-APPOINTMENT (OUTPATIENT)
Age: 53
End: 2023-08-29

## 2023-08-30 ENCOUNTER — APPOINTMENT (OUTPATIENT)
Dept: VASCULAR SURGERY | Facility: CLINIC | Age: 53
End: 2023-08-30
Payer: MEDICAID

## 2023-08-30 DIAGNOSIS — L03.116 CELLULITIS OF LEFT LOWER LIMB: ICD-10-CM

## 2023-08-30 PROCEDURE — 99213 OFFICE O/P EST LOW 20 MIN: CPT

## 2023-08-30 RX ORDER — CLINDAMYCIN HYDROCHLORIDE 300 MG/1
300 CAPSULE ORAL EVERY 8 HOURS
Qty: 30 | Refills: 0 | Status: ACTIVE | COMMUNITY
Start: 2023-08-30 | End: 1900-01-01

## 2023-08-30 NOTE — REASON FOR VISIT
[Follow-Up: _____] : a [unfilled] follow-up visit [FreeTextEntry1] : The patient presents for follow-up.  The patient had a left leg posterior calf venous stasis ulcer.

## 2023-08-30 NOTE — HISTORY OF PRESENT ILLNESS
[FreeTextEntry1] : The patient presents for follow-up he had an Unna boot placed to his left lower extremity for a few weeks to treat his venous stasis wound.  His posterior calf wound is healed.  Patient has right thigh cellulitis present

## 2023-08-30 NOTE — ASSESSMENT
[FreeTextEntry1] : The patient is a 53-year-old male status post Unna boot therapy for a left leg venous stasis ulcer.  The patient's venous stasis ulcer is healed however he developed left thigh cellulitis.  The patient was treated with Bactrim in the past and complains of a rash.  The patient is also allergic to Keflex.  I am prescribing clindamycin 300 mg p.o. 3 times daily for the next 10 days.  I will see the patient back in my office in 2 weeks time or sooner if any symptoms develop I recommend Ace wrap compression daily to his left lower extremity

## 2023-08-30 NOTE — PHYSICAL EXAM
[2+] : left 2+ [FreeTextEntry1] : Left leg left thigh swelling erythema warmth tenderness posterior calf ulcer is healed

## 2023-08-31 RX ORDER — CIPROFLOXACIN HYDROCHLORIDE 500 MG/1
500 TABLET, FILM COATED ORAL
Qty: 14 | Refills: 0 | Status: ACTIVE | COMMUNITY
Start: 2023-08-31 | End: 1900-01-01

## 2023-08-31 RX ORDER — GENTAMICIN SULFATE 1 MG/G
0.1 OINTMENT TOPICAL TWICE DAILY
Qty: 1 | Refills: 1 | Status: ACTIVE | COMMUNITY
Start: 2023-08-31 | End: 1900-01-01

## 2023-09-06 ENCOUNTER — APPOINTMENT (OUTPATIENT)
Dept: VASCULAR SURGERY | Facility: CLINIC | Age: 53
End: 2023-09-06
Payer: MEDICAID

## 2023-09-06 PROCEDURE — 99211 OFF/OP EST MAY X REQ PHY/QHP: CPT

## 2023-09-06 NOTE — ASSESSMENT
[FreeTextEntry1] : The patient is a 53-year-old male status post Unna boot therapy for a left leg venous stasis ulcer.  The would have fully healed  Follow up as needed

## 2023-09-26 ENCOUNTER — APPOINTMENT (OUTPATIENT)
Dept: BURN CARE | Facility: CLINIC | Age: 53
End: 2023-09-26
Payer: MEDICAID

## 2023-09-26 ENCOUNTER — OUTPATIENT (OUTPATIENT)
Dept: OUTPATIENT SERVICES | Facility: HOSPITAL | Age: 53
LOS: 1 days | End: 2023-09-26
Payer: MEDICAID

## 2023-09-26 DIAGNOSIS — Z98.890 OTHER SPECIFIED POSTPROCEDURAL STATES: Chronic | ICD-10-CM

## 2023-09-26 DIAGNOSIS — Z96.659 PRESENCE OF UNSPECIFIED ARTIFICIAL KNEE JOINT: Chronic | ICD-10-CM

## 2023-09-26 DIAGNOSIS — Z00.8 ENCOUNTER FOR OTHER GENERAL EXAMINATION: ICD-10-CM

## 2023-09-26 DIAGNOSIS — Z90.49 ACQUIRED ABSENCE OF OTHER SPECIFIED PARTS OF DIGESTIVE TRACT: Chronic | ICD-10-CM

## 2023-09-26 PROCEDURE — 99212 OFFICE O/P EST SF 10 MIN: CPT

## 2023-09-27 DIAGNOSIS — R60.0 LOCALIZED EDEMA: ICD-10-CM

## 2023-09-27 DIAGNOSIS — Z09 ENCOUNTER FOR FOLLOW-UP EXAMINATION AFTER COMPLETED TREATMENT FOR CONDITIONS OTHER THAN MALIGNANT NEOPLASM: ICD-10-CM

## 2023-09-27 DIAGNOSIS — Z87.2 PERSONAL HISTORY OF DISEASES OF THE SKIN AND SUBCUTANEOUS TISSUE: ICD-10-CM

## 2024-02-09 NOTE — PROGRESS NOTE ADULT - ENMT
no gross abnormalities Cr of 1.31 & eGFR 64 on admission, continues to produce urine  multiple recent admissions for NOLAN on CKD complicated by hyperK+  likely multifactorial iso DM2, HTN and HFpEF  echo from 12/22 w/ LVEF 71%  home medication(s): olmesartan 40mg qd, farxiga 10mg qd, amlodipine 10mg QDat home    - cont home amlodipine as above  - continue home olmesartan  - hold home farxiga  - encourage PO hydration, ivf as-needed  - limit nephrotoxins  - monitor BMP/Cr

## 2024-08-19 NOTE — ED ADULT NURSE NOTE - NSSISCREENINGQ4_ED_A_ED
Chief Complaint   Patient presents with    Thyroid Problem     PCP and pharmacy confirmed    Diabetes     History of Present Illness: Liz Gillis is a 51 y.o. female here for follow up of diabetes.  Weight down 2 lbs since last visit in 5/24.  Will be headed on a cruise to the [facility] out of [place] later this week and will be going with her  and goddaughter and her boyfriend.  Compliant with jardiance and unithroid.  Did not have her toprol XL last night as she ran out and will be picking up today.  Had COVID the week before her labs were drawn and her BP was up much higher that work with taking a lot of Nyquil.        Current Outpatient Medications   Medication Sig    UNITHROID 150 MCG tablet Take 1 tablet by mouth Daily BRAND MEDICALLY NECESSARY--patient will pay cash and use [email or web address] coupon--Delete 137 mcg dose from profile    empagliflozin (JARDIANCE) 25 MG tablet Take 1 tablet by mouth daily patient will pay cash and use [employer] coupon    chlorthalidone (HYGROTON) 25 MG tablet TAKE 1 TABLET BY MOUTH EVERY DAY    metoprolol succinate (TOPROL XL) 200 MG extended release tablet TAKE 1 TABLET BY MOUTH EVERY DAY    losartan (COZAAR) 100 MG tablet TAKE 1 TABLET BY MOUTH EVERY DAY     No current facility-administered medications for this visit.     Allergies   Allergen Reactions    Lisinopril Cough       Review of Systems: PER HPI    Physical Examination:  Blood pressure (!) 150/102, pulse 78, height 1.575 m (5' 2\"), weight (!) 182.1 kg (401 lb 6.4 oz).  General: pleasant, no distress, good eye contact   Neck: no carotid bruits  Cardiovascular: regular, normal rate, nl s1 and s2, no m/r/g,   Respiratory: clear bilaterally  Integumentary: no edema,   Psychiatric: normal mood and affect    Diabetic foot exam:   Left Foot:   Visual Exam: callous- mild   Pulse DP: 2+ (normal)   Filament test: normal sensation   Vibratory Sensation: normal  Right Foot:   Visual Exam: callous- mild   Pulse DP: 2+ 
No

## 2025-06-08 NOTE — CONSULT NOTE ADULT - ATTENDING COMMENTS
[facility] informed of severe range BP and serial BP set up to go off for the next hour. MD stated they will put in hypertension order set.   
outpatient follow up

## 2025-07-08 NOTE — ED ADULT NURSE NOTE - NSFALLRISK_ED_ALL_ED
"Chief Complaint  Cough (Pt states that she has been sick for a couple of days )    Subjective        Ashley Gibbs presents to [facility]  History of Present Illness  The patient is a 66-year-old female who presents today for congestion and runny nose.    She has been experiencing symptoms of illness for the past few days, including increased congestion and wheezing. She reports persistent nasal drainage but does not have any throat discomfort. Her cough is manageable, and she does not require any medication for it at this time. She is seeking treatment to prevent further deterioration of her condition, as she has plans to travel to [place] on Thursday. She is requesting a steroid injection.    Objective   Vital Signs:  /79 (BP Location: Left arm, Patient Position: Sitting, Cuff Size: Adult)   Pulse 114   Temp 99.8 °F (37.7 °C) (Infrared)   Resp 20   Ht 170.2 cm (67.01\")   Wt 64.9 kg (143 lb)   SpO2 96% Comment: 2 liters o2  BMI 22.39 kg/m²   Estimated body mass index is 22.39 kg/m² as calculated from the following:    Height as of this encounter: 170.2 cm (67.01\").    Weight as of this encounter: 64.9 kg (143 lb).       BMI is within normal parameters. No other follow-up for BMI required.      Physical Exam  Vitals and nursing note reviewed.   Constitutional:       General: She is not in acute distress.     Appearance: She is well-developed.   HENT:      Head: Normocephalic and atraumatic.      Right Ear: Tympanic membrane and ear canal normal.      Left Ear: Tympanic membrane and ear canal normal.      Nose: Congestion present.      Right Sinus: No maxillary sinus tenderness or frontal sinus tenderness.      Left Sinus: No maxillary sinus tenderness or frontal sinus tenderness.      Mouth/Throat:      Mouth: Mucous membranes are moist.      Pharynx: Oropharynx is clear. Uvula midline. No uvula swelling.   Eyes:      Conjunctiva/sclera: Conjunctivae normal.   Neck:      " Thyroid: No thyromegaly.      Trachea: No tracheal deviation.   Cardiovascular:      Rate and Rhythm: Normal rate and regular rhythm.      Heart sounds: Normal heart sounds.   Pulmonary:      Effort: Pulmonary effort is normal.      Breath sounds: Decreased air movement present. Wheezing and rhonchi present.   Musculoskeletal:      Cervical back: Neck supple.   Lymphadenopathy:      Cervical: No cervical adenopathy.   Skin:     General: Skin is warm and dry.   Neurological:      Mental Status: She is alert.   Psychiatric:         Behavior: Behavior normal.        Physical Exam      Result Review :          Results             Assessment and Plan     Diagnoses and all orders for this visit:    1. Bronchitis (Primary)  -     dexAMETHasone (DECADRON) injection 10 mg    2. Rhinosinusitis  -     dexAMETHasone (DECADRON) injection 10 mg    Other orders  -     methylPREDNISolone (MEDROL) 4 MG dose pack; Take as directed on package instructions.  Dispense: 21 tablet; Refill: 0  -     azithromycin (Zithromax Z-Jonas) 250 MG tablet; Take 2 tablets by mouth on day 1, then 1 tablet daily on days 2-5  Dispense: 6 tablet; Refill: 0      Assessment & Plan  1. Bronchitis/ rhinosinusitis: Acute.  - Symptoms present for a couple of days, including nasal drainage and wheezing.  - Steroid injection will be administered today.  - Prescription for a steroid pack and Z-Jonas will be sent to the pharmacy.  - Advised to contact the office if an alternative antibiotic is needed.      Follow-up  - Reach out to the office if an alternative antibiotic is needed.       Follow Up     Return in about 1 week (around 6/30/2025), or if symptoms worsen or fail to improve.  Patient was given instructions and counseling regarding her condition or for health maintenance advice. Please see specific information pulled into the AVS if appropriate.       Patient or patient representative verbalized consent for the use of Ambient Listening during the visit with   CARRIE Medina for chart documentation. 7/7/2025  20:36 CDT   No

## 2025-08-19 ENCOUNTER — RESULT REVIEW (OUTPATIENT)
Age: 55
End: 2025-08-19

## 2025-08-19 ENCOUNTER — INPATIENT (INPATIENT)
Facility: HOSPITAL | Age: 55
LOS: 1 days | Discharge: HOME | DRG: 134 | End: 2025-08-21
Attending: HOSPITALIST | Admitting: INTERNAL MEDICINE
Payer: MEDICAID

## 2025-08-19 VITALS
DIASTOLIC BLOOD PRESSURE: 63 MMHG | OXYGEN SATURATION: 96 % | RESPIRATION RATE: 28 BRPM | TEMPERATURE: 98 F | SYSTOLIC BLOOD PRESSURE: 79 MMHG | HEART RATE: 111 BPM | HEIGHT: 72 IN

## 2025-08-19 DIAGNOSIS — I26.99 OTHER PULMONARY EMBOLISM WITHOUT ACUTE COR PULMONALE: ICD-10-CM

## 2025-08-19 DIAGNOSIS — Z90.49 ACQUIRED ABSENCE OF OTHER SPECIFIED PARTS OF DIGESTIVE TRACT: Chronic | ICD-10-CM

## 2025-08-19 DIAGNOSIS — R09.89 OTHER SPECIFIED SYMPTOMS AND SIGNS INVOLVING THE CIRCULATORY AND RESPIRATORY SYSTEMS: ICD-10-CM

## 2025-08-19 DIAGNOSIS — Z98.890 OTHER SPECIFIED POSTPROCEDURAL STATES: Chronic | ICD-10-CM

## 2025-08-19 DIAGNOSIS — Z96.659 PRESENCE OF UNSPECIFIED ARTIFICIAL KNEE JOINT: Chronic | ICD-10-CM

## 2025-08-19 LAB
A1C WITH ESTIMATED AVERAGE GLUCOSE RESULT: 13 % — HIGH (ref 4–5.6)
ALBUMIN SERPL ELPH-MCNC: 3.9 G/DL — SIGNIFICANT CHANGE UP (ref 3.5–5.2)
ALP SERPL-CCNC: 122 U/L — HIGH (ref 30–115)
ALT FLD-CCNC: 83 U/L — HIGH (ref 0–41)
ANION GAP SERPL CALC-SCNC: 18 MMOL/L — HIGH (ref 7–14)
APTT BLD: 28.5 SEC — SIGNIFICANT CHANGE UP (ref 27–39.2)
APTT BLD: 28.8 SEC — SIGNIFICANT CHANGE UP (ref 27–39.2)
APTT BLD: 30.9 SEC — SIGNIFICANT CHANGE UP (ref 27–39.2)
APTT BLD: 58 SEC — HIGH (ref 27–39.2)
AST SERPL-CCNC: 66 U/L — HIGH (ref 0–41)
B-OH-BUTYR SERPL-SCNC: 0.5 MMOL/L — HIGH
BASOPHILS # BLD AUTO: 0.06 K/UL — SIGNIFICANT CHANGE UP (ref 0–0.2)
BASOPHILS NFR BLD AUTO: 0.4 % — SIGNIFICANT CHANGE UP (ref 0–1)
BILIRUB SERPL-MCNC: 0.9 MG/DL — SIGNIFICANT CHANGE UP (ref 0.2–1.2)
BUN SERPL-MCNC: 22 MG/DL — HIGH (ref 10–20)
CALCIUM SERPL-MCNC: 8.9 MG/DL — SIGNIFICANT CHANGE UP (ref 8.4–10.5)
CHLORIDE SERPL-SCNC: 97 MMOL/L — LOW (ref 98–110)
CO2 SERPL-SCNC: 19 MMOL/L — SIGNIFICANT CHANGE UP (ref 17–32)
CREAT SERPL-MCNC: 1.6 MG/DL — HIGH (ref 0.7–1.5)
EGFR: 50 ML/MIN/1.73M2 — LOW
EGFR: 50 ML/MIN/1.73M2 — LOW
EOSINOPHIL # BLD AUTO: 0.07 K/UL — SIGNIFICANT CHANGE UP (ref 0–0.7)
EOSINOPHIL NFR BLD AUTO: 0.5 % — SIGNIFICANT CHANGE UP (ref 0–8)
ESTIMATED AVERAGE GLUCOSE: 326 MG/DL — HIGH (ref 68–114)
GAS PNL BLDA: SIGNIFICANT CHANGE UP
GAS PNL BLDV: SIGNIFICANT CHANGE UP
GLUCOSE BLDC GLUCOMTR-MCNC: 193 MG/DL — HIGH (ref 70–99)
GLUCOSE BLDC GLUCOMTR-MCNC: 276 MG/DL — HIGH (ref 70–99)
GLUCOSE BLDC GLUCOMTR-MCNC: 282 MG/DL — HIGH (ref 70–99)
GLUCOSE BLDC GLUCOMTR-MCNC: 301 MG/DL — HIGH (ref 70–99)
GLUCOSE BLDC GLUCOMTR-MCNC: 332 MG/DL — HIGH (ref 70–99)
GLUCOSE BLDC GLUCOMTR-MCNC: 338 MG/DL — HIGH (ref 70–99)
GLUCOSE BLDC GLUCOMTR-MCNC: 384 MG/DL — HIGH (ref 70–99)
GLUCOSE BLDC GLUCOMTR-MCNC: 469 MG/DL — CRITICAL HIGH (ref 70–99)
GLUCOSE SERPL-MCNC: 509 MG/DL — CRITICAL HIGH (ref 70–99)
HCT VFR BLD CALC: 42.4 % — SIGNIFICANT CHANGE UP (ref 42–52)
HCT VFR BLD CALC: 47 % — SIGNIFICANT CHANGE UP (ref 42–52)
HGB BLD-MCNC: 14.2 G/DL — SIGNIFICANT CHANGE UP (ref 14–18)
HGB BLD-MCNC: 15.6 G/DL — SIGNIFICANT CHANGE UP (ref 14–18)
IMM GRANULOCYTES NFR BLD AUTO: 0.6 % — HIGH (ref 0.1–0.3)
INR BLD: 1.06 RATIO — SIGNIFICANT CHANGE UP (ref 0.65–1.3)
INR BLD: 1.22 RATIO — SIGNIFICANT CHANGE UP (ref 0.65–1.3)
LACTATE SERPL-SCNC: 2.5 MMOL/L — HIGH (ref 0.7–2)
LYMPHOCYTES # BLD AUTO: 19.1 % — LOW (ref 20.5–51.1)
LYMPHOCYTES # BLD AUTO: 2.71 K/UL — SIGNIFICANT CHANGE UP (ref 1.2–3.4)
MCHC RBC-ENTMCNC: 30 PG — SIGNIFICANT CHANGE UP (ref 27–31)
MCHC RBC-ENTMCNC: 30.1 PG — SIGNIFICANT CHANGE UP (ref 27–31)
MCHC RBC-ENTMCNC: 33.2 G/DL — SIGNIFICANT CHANGE UP (ref 32–37)
MCHC RBC-ENTMCNC: 33.5 G/DL — SIGNIFICANT CHANGE UP (ref 32–37)
MCV RBC AUTO: 89.5 FL — SIGNIFICANT CHANGE UP (ref 80–94)
MCV RBC AUTO: 90.7 FL — SIGNIFICANT CHANGE UP (ref 80–94)
MONOCYTES # BLD AUTO: 0.71 K/UL — HIGH (ref 0.1–0.6)
MONOCYTES NFR BLD AUTO: 5 % — SIGNIFICANT CHANGE UP (ref 1.7–9.3)
NEUTROPHILS # BLD AUTO: 10.58 K/UL — HIGH (ref 1.4–6.5)
NEUTROPHILS NFR BLD AUTO: 74.4 % — SIGNIFICANT CHANGE UP (ref 42.2–75.2)
NRBC BLD AUTO-RTO: 0 /100 WBCS — SIGNIFICANT CHANGE UP (ref 0–0)
NRBC BLD AUTO-RTO: 0 /100 WBCS — SIGNIFICANT CHANGE UP (ref 0–0)
NT-PROBNP SERPL-SCNC: HIGH PG/ML (ref 0–300)
PLATELET # BLD AUTO: 147 K/UL — SIGNIFICANT CHANGE UP (ref 130–400)
PLATELET # BLD AUTO: 172 K/UL — SIGNIFICANT CHANGE UP (ref 130–400)
PMV BLD: 11.7 FL — HIGH (ref 7.4–10.4)
PMV BLD: 12.3 FL — HIGH (ref 7.4–10.4)
POTASSIUM SERPL-MCNC: 4.7 MMOL/L — SIGNIFICANT CHANGE UP (ref 3.5–5)
POTASSIUM SERPL-SCNC: 4.7 MMOL/L — SIGNIFICANT CHANGE UP (ref 3.5–5)
PROT SERPL-MCNC: 7.1 G/DL — SIGNIFICANT CHANGE UP (ref 6–8)
PROTHROM AB SERPL-ACNC: 12.5 SEC — SIGNIFICANT CHANGE UP (ref 9.95–12.87)
PROTHROM AB SERPL-ACNC: 14.4 SEC — HIGH (ref 9.95–12.87)
RBC # BLD: 4.74 M/UL — SIGNIFICANT CHANGE UP (ref 4.7–6.1)
RBC # BLD: 5.18 M/UL — SIGNIFICANT CHANGE UP (ref 4.7–6.1)
RBC # FLD: 12.9 % — SIGNIFICANT CHANGE UP (ref 11.5–14.5)
RBC # FLD: 13 % — SIGNIFICANT CHANGE UP (ref 11.5–14.5)
SODIUM SERPL-SCNC: 134 MMOL/L — LOW (ref 135–146)
TROPONIN T, HIGH SENSITIVITY RESULT: 198 NG/L — CRITICAL HIGH (ref 6–21)
TROPONIN T, HIGH SENSITIVITY RESULT: 274 NG/L — CRITICAL HIGH (ref 6–21)
TROPONIN T, HIGH SENSITIVITY RESULT: 280 NG/L — CRITICAL HIGH (ref 6–21)
TROPONIN T, HIGH SENSITIVITY RESULT: 42 NG/L — HIGH (ref 6–21)
WBC # BLD: 12.77 K/UL — HIGH (ref 4.8–10.8)
WBC # BLD: 14.22 K/UL — HIGH (ref 4.8–10.8)
WBC # FLD AUTO: 12.77 K/UL — HIGH (ref 4.8–10.8)
WBC # FLD AUTO: 14.22 K/UL — HIGH (ref 4.8–10.8)

## 2025-08-19 PROCEDURE — 82010 KETONE BODYS QUAN: CPT

## 2025-08-19 PROCEDURE — 93970 EXTREMITY STUDY: CPT | Mod: 26

## 2025-08-19 PROCEDURE — 84484 ASSAY OF TROPONIN QUANT: CPT

## 2025-08-19 PROCEDURE — 82962 GLUCOSE BLOOD TEST: CPT

## 2025-08-19 PROCEDURE — 99291 CRITICAL CARE FIRST HOUR: CPT | Mod: 25

## 2025-08-19 PROCEDURE — 36415 COLL VENOUS BLD VENIPUNCTURE: CPT

## 2025-08-19 PROCEDURE — 84100 ASSAY OF PHOSPHORUS: CPT

## 2025-08-19 PROCEDURE — 93306 TTE W/DOPPLER COMPLETE: CPT

## 2025-08-19 PROCEDURE — 71045 X-RAY EXAM CHEST 1 VIEW: CPT

## 2025-08-19 PROCEDURE — 85014 HEMATOCRIT: CPT

## 2025-08-19 PROCEDURE — 85610 PROTHROMBIN TIME: CPT

## 2025-08-19 PROCEDURE — 93010 ELECTROCARDIOGRAM REPORT: CPT | Mod: 77

## 2025-08-19 PROCEDURE — 93306 TTE W/DOPPLER COMPLETE: CPT | Mod: 26

## 2025-08-19 PROCEDURE — 85730 THROMBOPLASTIN TIME PARTIAL: CPT

## 2025-08-19 PROCEDURE — 36620 INSERTION CATHETER ARTERY: CPT

## 2025-08-19 PROCEDURE — 71275 CT ANGIOGRAPHY CHEST: CPT | Mod: 26

## 2025-08-19 PROCEDURE — 93308 TTE F-UP OR LMTD: CPT | Mod: 26

## 2025-08-19 PROCEDURE — 99291 CRITICAL CARE FIRST HOUR: CPT

## 2025-08-19 PROCEDURE — 80053 COMPREHEN METABOLIC PANEL: CPT

## 2025-08-19 PROCEDURE — 93010 ELECTROCARDIOGRAM REPORT: CPT

## 2025-08-19 PROCEDURE — 87640 STAPH A DNA AMP PROBE: CPT

## 2025-08-19 PROCEDURE — 85025 COMPLETE CBC W/AUTO DIFF WBC: CPT

## 2025-08-19 PROCEDURE — 85027 COMPLETE CBC AUTOMATED: CPT

## 2025-08-19 PROCEDURE — 82330 ASSAY OF CALCIUM: CPT

## 2025-08-19 PROCEDURE — 83036 HEMOGLOBIN GLYCOSYLATED A1C: CPT

## 2025-08-19 PROCEDURE — 83605 ASSAY OF LACTIC ACID: CPT

## 2025-08-19 PROCEDURE — 82803 BLOOD GASES ANY COMBINATION: CPT

## 2025-08-19 PROCEDURE — 70450 CT HEAD/BRAIN W/O DYE: CPT | Mod: 26

## 2025-08-19 PROCEDURE — 84132 ASSAY OF SERUM POTASSIUM: CPT

## 2025-08-19 PROCEDURE — 93005 ELECTROCARDIOGRAM TRACING: CPT

## 2025-08-19 PROCEDURE — 93970 EXTREMITY STUDY: CPT

## 2025-08-19 PROCEDURE — 84295 ASSAY OF SERUM SODIUM: CPT

## 2025-08-19 PROCEDURE — 83735 ASSAY OF MAGNESIUM: CPT

## 2025-08-19 PROCEDURE — 85018 HEMOGLOBIN: CPT

## 2025-08-19 PROCEDURE — 87641 MR-STAPH DNA AMP PROBE: CPT

## 2025-08-19 RX ORDER — INSULIN LISPRO 100 U/ML
5 INJECTION, SOLUTION INTRAVENOUS; SUBCUTANEOUS
Refills: 0 | Status: DISCONTINUED | OUTPATIENT
Start: 2025-08-19 | End: 2025-08-19

## 2025-08-19 RX ORDER — INSULIN LISPRO 100 U/ML
INJECTION, SOLUTION INTRAVENOUS; SUBCUTANEOUS
Refills: 0 | Status: DISCONTINUED | OUTPATIENT
Start: 2025-08-19 | End: 2025-08-21

## 2025-08-19 RX ORDER — DEXTROSE 50 % IN WATER 50 %
25 SYRINGE (ML) INTRAVENOUS ONCE
Refills: 0 | Status: DISCONTINUED | OUTPATIENT
Start: 2025-08-19 | End: 2025-08-21

## 2025-08-19 RX ORDER — INSULIN GLARGINE-YFGN 100 [IU]/ML
10 INJECTION, SOLUTION SUBCUTANEOUS ONCE
Refills: 0 | Status: COMPLETED | OUTPATIENT
Start: 2025-08-19 | End: 2025-08-19

## 2025-08-19 RX ORDER — HEPARIN SODIUM 1000 [USP'U]/ML
10000 INJECTION INTRAVENOUS; SUBCUTANEOUS EVERY 6 HOURS
Refills: 0 | Status: DISCONTINUED | OUTPATIENT
Start: 2025-08-19 | End: 2025-08-21

## 2025-08-19 RX ORDER — HEPARIN SODIUM 1000 [USP'U]/ML
5000 INJECTION INTRAVENOUS; SUBCUTANEOUS EVERY 6 HOURS
Refills: 0 | Status: DISCONTINUED | OUTPATIENT
Start: 2025-08-19 | End: 2025-08-21

## 2025-08-19 RX ORDER — ALTEPLASE 2.2 MG/2ML
100 INJECTION, POWDER, LYOPHILIZED, FOR SOLUTION INTRAVENOUS ONCE
Refills: 0 | Status: COMPLETED | OUTPATIENT
Start: 2025-08-19 | End: 2025-08-19

## 2025-08-19 RX ORDER — INSULIN LISPRO 100 U/ML
10 INJECTION, SOLUTION INTRAVENOUS; SUBCUTANEOUS
Refills: 0 | Status: DISCONTINUED | OUTPATIENT
Start: 2025-08-19 | End: 2025-08-21

## 2025-08-19 RX ORDER — INSULIN GLARGINE-YFGN 100 [IU]/ML
20 INJECTION, SOLUTION SUBCUTANEOUS AT BEDTIME
Refills: 0 | Status: DISCONTINUED | OUTPATIENT
Start: 2025-08-19 | End: 2025-08-21

## 2025-08-19 RX ORDER — HEPARIN SODIUM 1000 [USP'U]/ML
INJECTION INTRAVENOUS; SUBCUTANEOUS
Qty: 25000 | Refills: 0 | Status: DISCONTINUED | OUTPATIENT
Start: 2025-08-19 | End: 2025-08-21

## 2025-08-19 RX ORDER — DEXTROSE 50 % IN WATER 50 %
12.5 SYRINGE (ML) INTRAVENOUS ONCE
Refills: 0 | Status: DISCONTINUED | OUTPATIENT
Start: 2025-08-19 | End: 2025-08-21

## 2025-08-19 RX ORDER — DEXTROSE 50 % IN WATER 50 %
15 SYRINGE (ML) INTRAVENOUS ONCE
Refills: 0 | Status: DISCONTINUED | OUTPATIENT
Start: 2025-08-19 | End: 2025-08-21

## 2025-08-19 RX ORDER — INSULIN LISPRO 100 U/ML
5 INJECTION, SOLUTION INTRAVENOUS; SUBCUTANEOUS ONCE
Refills: 0 | Status: COMPLETED | OUTPATIENT
Start: 2025-08-19 | End: 2025-08-19

## 2025-08-19 RX ORDER — PHENYLEPHRINE HCL IN 0.9% NACL 0.5 MG/5ML
0.1 SYRINGE (ML) INTRAVENOUS
Qty: 40 | Refills: 0 | Status: DISCONTINUED | OUTPATIENT
Start: 2025-08-19 | End: 2025-08-20

## 2025-08-19 RX ORDER — ACETAMINOPHEN 500 MG/5ML
650 LIQUID (ML) ORAL EVERY 6 HOURS
Refills: 0 | Status: DISCONTINUED | OUTPATIENT
Start: 2025-08-19 | End: 2025-08-21

## 2025-08-19 RX ORDER — BUDESONIDE 0.25 MG/2ML
0.25 SUSPENSION RESPIRATORY (INHALATION) EVERY 12 HOURS
Refills: 0 | Status: DISCONTINUED | OUTPATIENT
Start: 2025-08-19 | End: 2025-08-20

## 2025-08-19 RX ORDER — INSULIN LISPRO 100 U/ML
6 INJECTION, SOLUTION INTRAVENOUS; SUBCUTANEOUS ONCE
Refills: 0 | Status: DISCONTINUED | OUTPATIENT
Start: 2025-08-19 | End: 2025-08-19

## 2025-08-19 RX ORDER — INSULIN LISPRO 100 U/ML
INJECTION, SOLUTION INTRAVENOUS; SUBCUTANEOUS
Refills: 0 | Status: DISCONTINUED | OUTPATIENT
Start: 2025-08-19 | End: 2025-08-19

## 2025-08-19 RX ORDER — SODIUM CHLORIDE 9 G/1000ML
1000 INJECTION, SOLUTION INTRAVENOUS
Refills: 0 | Status: DISCONTINUED | OUTPATIENT
Start: 2025-08-19 | End: 2025-08-21

## 2025-08-19 RX ORDER — GLUCAGON 3 MG/1
1 POWDER NASAL ONCE
Refills: 0 | Status: DISCONTINUED | OUTPATIENT
Start: 2025-08-19 | End: 2025-08-21

## 2025-08-19 RX ADMIN — ALTEPLASE 50 MILLIGRAM(S): 2.2 INJECTION, POWDER, LYOPHILIZED, FOR SOLUTION INTRAVENOUS at 04:39

## 2025-08-19 RX ADMIN — INSULIN LISPRO 5 UNIT(S): 100 INJECTION, SOLUTION INTRAVENOUS; SUBCUTANEOUS at 12:13

## 2025-08-19 RX ADMIN — Medication 40 MILLIGRAM(S): at 08:48

## 2025-08-19 RX ADMIN — Medication 5 UNIT(S): at 06:51

## 2025-08-19 RX ADMIN — INSULIN LISPRO 2: 100 INJECTION, SOLUTION INTRAVENOUS; SUBCUTANEOUS at 21:34

## 2025-08-19 RX ADMIN — INSULIN GLARGINE-YFGN 10 UNIT(S): 100 INJECTION, SOLUTION SUBCUTANEOUS at 09:58

## 2025-08-19 RX ADMIN — INSULIN GLARGINE-YFGN 20 UNIT(S): 100 INJECTION, SOLUTION SUBCUTANEOUS at 21:35

## 2025-08-19 RX ADMIN — INSULIN LISPRO 6: 100 INJECTION, SOLUTION INTRAVENOUS; SUBCUTANEOUS at 08:47

## 2025-08-19 RX ADMIN — INSULIN LISPRO 5 UNIT(S): 100 INJECTION, SOLUTION INTRAVENOUS; SUBCUTANEOUS at 08:48

## 2025-08-19 RX ADMIN — HEPARIN SODIUM 2400 UNIT(S)/HR: 1000 INJECTION INTRAVENOUS; SUBCUTANEOUS at 12:11

## 2025-08-19 RX ADMIN — Medication 5.51 MICROGRAM(S)/KG/MIN: at 02:30

## 2025-08-19 RX ADMIN — Medication 1 APPLICATION(S): at 12:14

## 2025-08-19 RX ADMIN — INSULIN LISPRO 10 UNIT(S): 100 INJECTION, SOLUTION INTRAVENOUS; SUBCUTANEOUS at 17:43

## 2025-08-19 RX ADMIN — INSULIN LISPRO 8: 100 INJECTION, SOLUTION INTRAVENOUS; SUBCUTANEOUS at 12:14

## 2025-08-19 RX ADMIN — HEPARIN SODIUM 2400 UNIT(S)/HR: 1000 INJECTION INTRAVENOUS; SUBCUTANEOUS at 18:28

## 2025-08-19 RX ADMIN — INSULIN LISPRO 6: 100 INJECTION, SOLUTION INTRAVENOUS; SUBCUTANEOUS at 17:43

## 2025-08-20 LAB
A1C WITH ESTIMATED AVERAGE GLUCOSE RESULT: 13.2 % — HIGH (ref 4–5.6)
ALBUMIN SERPL ELPH-MCNC: 3.3 G/DL — LOW (ref 3.5–5.2)
ALP SERPL-CCNC: 91 U/L — SIGNIFICANT CHANGE UP (ref 30–115)
ALT FLD-CCNC: 54 U/L — HIGH (ref 0–41)
ANION GAP SERPL CALC-SCNC: 11 MMOL/L — SIGNIFICANT CHANGE UP (ref 7–14)
APTT BLD: 67.3 SEC — HIGH (ref 27–39.2)
AST SERPL-CCNC: 29 U/L — SIGNIFICANT CHANGE UP (ref 0–41)
BASOPHILS # BLD AUTO: 0.05 K/UL — SIGNIFICANT CHANGE UP (ref 0–0.2)
BASOPHILS NFR BLD AUTO: 0.4 % — SIGNIFICANT CHANGE UP (ref 0–1)
BILIRUB SERPL-MCNC: 0.5 MG/DL — SIGNIFICANT CHANGE UP (ref 0.2–1.2)
BUN SERPL-MCNC: 15 MG/DL — SIGNIFICANT CHANGE UP (ref 10–20)
CALCIUM SERPL-MCNC: 8.2 MG/DL — LOW (ref 8.4–10.5)
CHLORIDE SERPL-SCNC: 102 MMOL/L — SIGNIFICANT CHANGE UP (ref 98–110)
CO2 SERPL-SCNC: 23 MMOL/L — SIGNIFICANT CHANGE UP (ref 17–32)
CREAT SERPL-MCNC: 0.9 MG/DL — SIGNIFICANT CHANGE UP (ref 0.7–1.5)
EGFR: 100 ML/MIN/1.73M2 — SIGNIFICANT CHANGE UP
EGFR: 100 ML/MIN/1.73M2 — SIGNIFICANT CHANGE UP
EOSINOPHIL # BLD AUTO: 0.13 K/UL — SIGNIFICANT CHANGE UP (ref 0–0.7)
EOSINOPHIL NFR BLD AUTO: 1.1 % — SIGNIFICANT CHANGE UP (ref 0–8)
ESTIMATED AVERAGE GLUCOSE: 332 MG/DL — HIGH (ref 68–114)
GLUCOSE BLDC GLUCOMTR-MCNC: 157 MG/DL — HIGH (ref 70–99)
GLUCOSE BLDC GLUCOMTR-MCNC: 171 MG/DL — HIGH (ref 70–99)
GLUCOSE BLDC GLUCOMTR-MCNC: 184 MG/DL — HIGH (ref 70–99)
GLUCOSE BLDC GLUCOMTR-MCNC: 189 MG/DL — HIGH (ref 70–99)
GLUCOSE BLDC GLUCOMTR-MCNC: 200 MG/DL — HIGH (ref 70–99)
GLUCOSE SERPL-MCNC: 216 MG/DL — HIGH (ref 70–99)
HCT VFR BLD CALC: 41.6 % — LOW (ref 42–52)
HGB BLD-MCNC: 14 G/DL — SIGNIFICANT CHANGE UP (ref 14–18)
IMM GRANULOCYTES NFR BLD AUTO: 0.5 % — HIGH (ref 0.1–0.3)
LACTATE SERPL-SCNC: 0.8 MMOL/L — SIGNIFICANT CHANGE UP (ref 0.7–2)
LYMPHOCYTES # BLD AUTO: 2.68 K/UL — SIGNIFICANT CHANGE UP (ref 1.2–3.4)
LYMPHOCYTES # BLD AUTO: 23.5 % — SIGNIFICANT CHANGE UP (ref 20.5–51.1)
MAGNESIUM SERPL-MCNC: 2 MG/DL — SIGNIFICANT CHANGE UP (ref 1.8–2.4)
MCHC RBC-ENTMCNC: 30.3 PG — SIGNIFICANT CHANGE UP (ref 27–31)
MCHC RBC-ENTMCNC: 33.7 G/DL — SIGNIFICANT CHANGE UP (ref 32–37)
MCV RBC AUTO: 90 FL — SIGNIFICANT CHANGE UP (ref 80–94)
MONOCYTES # BLD AUTO: 0.54 K/UL — SIGNIFICANT CHANGE UP (ref 0.1–0.6)
MONOCYTES NFR BLD AUTO: 4.7 % — SIGNIFICANT CHANGE UP (ref 1.7–9.3)
MRSA PCR RESULT.: SIGNIFICANT CHANGE UP
NEUTROPHILS # BLD AUTO: 7.96 K/UL — HIGH (ref 1.4–6.5)
NEUTROPHILS NFR BLD AUTO: 69.8 % — SIGNIFICANT CHANGE UP (ref 42.2–75.2)
NRBC BLD AUTO-RTO: 0 /100 WBCS — SIGNIFICANT CHANGE UP (ref 0–0)
PHOSPHATE SERPL-MCNC: 2.9 MG/DL — SIGNIFICANT CHANGE UP (ref 2.1–4.9)
PLATELET # BLD AUTO: 137 K/UL — SIGNIFICANT CHANGE UP (ref 130–400)
PMV BLD: 11.7 FL — HIGH (ref 7.4–10.4)
POTASSIUM SERPL-MCNC: 4.1 MMOL/L — SIGNIFICANT CHANGE UP (ref 3.5–5)
POTASSIUM SERPL-SCNC: 4.1 MMOL/L — SIGNIFICANT CHANGE UP (ref 3.5–5)
PROT SERPL-MCNC: 5.9 G/DL — LOW (ref 6–8)
RBC # BLD: 4.62 M/UL — LOW (ref 4.7–6.1)
RBC # FLD: 13 % — SIGNIFICANT CHANGE UP (ref 11.5–14.5)
SODIUM SERPL-SCNC: 136 MMOL/L — SIGNIFICANT CHANGE UP (ref 135–146)
TROPONIN T, HIGH SENSITIVITY RESULT: 148 NG/L — CRITICAL HIGH (ref 6–21)
WBC # BLD: 11.42 K/UL — HIGH (ref 4.8–10.8)
WBC # FLD AUTO: 11.42 K/UL — HIGH (ref 4.8–10.8)

## 2025-08-20 PROCEDURE — 71045 X-RAY EXAM CHEST 1 VIEW: CPT | Mod: 26

## 2025-08-20 PROCEDURE — 99233 SBSQ HOSP IP/OBS HIGH 50: CPT

## 2025-08-20 RX ORDER — HYDROXYZINE HYDROCHLORIDE 25 MG/1
25 TABLET, FILM COATED ORAL ONCE
Refills: 0 | Status: DISCONTINUED | OUTPATIENT
Start: 2025-08-20 | End: 2025-08-21

## 2025-08-20 RX ADMIN — INSULIN LISPRO 10 UNIT(S): 100 INJECTION, SOLUTION INTRAVENOUS; SUBCUTANEOUS at 07:56

## 2025-08-20 RX ADMIN — Medication 40 MILLIGRAM(S): at 06:44

## 2025-08-20 RX ADMIN — INSULIN LISPRO 2: 100 INJECTION, SOLUTION INTRAVENOUS; SUBCUTANEOUS at 17:32

## 2025-08-20 RX ADMIN — INSULIN GLARGINE-YFGN 20 UNIT(S): 100 INJECTION, SOLUTION SUBCUTANEOUS at 22:43

## 2025-08-20 RX ADMIN — Medication 1 APPLICATION(S): at 11:49

## 2025-08-20 RX ADMIN — HEPARIN SODIUM 2400 UNIT(S)/HR: 1000 INJECTION INTRAVENOUS; SUBCUTANEOUS at 17:49

## 2025-08-20 RX ADMIN — INSULIN LISPRO 2: 100 INJECTION, SOLUTION INTRAVENOUS; SUBCUTANEOUS at 07:55

## 2025-08-20 RX ADMIN — INSULIN LISPRO 10 UNIT(S): 100 INJECTION, SOLUTION INTRAVENOUS; SUBCUTANEOUS at 17:33

## 2025-08-20 RX ADMIN — INSULIN LISPRO 2: 100 INJECTION, SOLUTION INTRAVENOUS; SUBCUTANEOUS at 22:50

## 2025-08-20 RX ADMIN — INSULIN LISPRO 10 UNIT(S): 100 INJECTION, SOLUTION INTRAVENOUS; SUBCUTANEOUS at 11:44

## 2025-08-20 RX ADMIN — HEPARIN SODIUM 2400 UNIT(S)/HR: 1000 INJECTION INTRAVENOUS; SUBCUTANEOUS at 02:08

## 2025-08-20 RX ADMIN — INSULIN LISPRO 2: 100 INJECTION, SOLUTION INTRAVENOUS; SUBCUTANEOUS at 11:44

## 2025-08-21 ENCOUNTER — TRANSCRIPTION ENCOUNTER (OUTPATIENT)
Age: 55
End: 2025-08-21

## 2025-08-21 VITALS — WEIGHT: 315 LBS | HEIGHT: 72.01 IN

## 2025-08-21 LAB
ALBUMIN SERPL ELPH-MCNC: 3.4 G/DL — LOW (ref 3.5–5.2)
ALP SERPL-CCNC: 86 U/L — SIGNIFICANT CHANGE UP (ref 30–115)
ALT FLD-CCNC: 35 U/L — SIGNIFICANT CHANGE UP (ref 0–41)
ANION GAP SERPL CALC-SCNC: 10 MMOL/L — SIGNIFICANT CHANGE UP (ref 7–14)
APTT BLD: 42.8 SEC — HIGH (ref 27–39.2)
AST SERPL-CCNC: 19 U/L — SIGNIFICANT CHANGE UP (ref 0–41)
BASOPHILS # BLD AUTO: 0.04 K/UL — SIGNIFICANT CHANGE UP (ref 0–0.2)
BASOPHILS NFR BLD AUTO: 0.4 % — SIGNIFICANT CHANGE UP (ref 0–1)
BILIRUB SERPL-MCNC: 0.5 MG/DL — SIGNIFICANT CHANGE UP (ref 0.2–1.2)
BUN SERPL-MCNC: 11 MG/DL — SIGNIFICANT CHANGE UP (ref 10–20)
CALCIUM SERPL-MCNC: 8.3 MG/DL — LOW (ref 8.4–10.5)
CHLORIDE SERPL-SCNC: 103 MMOL/L — SIGNIFICANT CHANGE UP (ref 98–110)
CO2 SERPL-SCNC: 27 MMOL/L — SIGNIFICANT CHANGE UP (ref 17–32)
CREAT SERPL-MCNC: 0.9 MG/DL — SIGNIFICANT CHANGE UP (ref 0.7–1.5)
EGFR: 100 ML/MIN/1.73M2 — SIGNIFICANT CHANGE UP
EGFR: 100 ML/MIN/1.73M2 — SIGNIFICANT CHANGE UP
EOSINOPHIL # BLD AUTO: 0.15 K/UL — SIGNIFICANT CHANGE UP (ref 0–0.7)
EOSINOPHIL NFR BLD AUTO: 1.6 % — SIGNIFICANT CHANGE UP (ref 0–8)
GLUCOSE BLDC GLUCOMTR-MCNC: 123 MG/DL — HIGH (ref 70–99)
GLUCOSE SERPL-MCNC: 139 MG/DL — HIGH (ref 70–99)
HCT VFR BLD CALC: 43.2 % — SIGNIFICANT CHANGE UP (ref 42–52)
HGB BLD-MCNC: 14.4 G/DL — SIGNIFICANT CHANGE UP (ref 14–18)
IMM GRANULOCYTES NFR BLD AUTO: 0.7 % — HIGH (ref 0.1–0.3)
LYMPHOCYTES # BLD AUTO: 2.46 K/UL — SIGNIFICANT CHANGE UP (ref 1.2–3.4)
LYMPHOCYTES # BLD AUTO: 26 % — SIGNIFICANT CHANGE UP (ref 20.5–51.1)
MAGNESIUM SERPL-MCNC: 2 MG/DL — SIGNIFICANT CHANGE UP (ref 1.8–2.4)
MCHC RBC-ENTMCNC: 30.4 PG — SIGNIFICANT CHANGE UP (ref 27–31)
MCHC RBC-ENTMCNC: 33.3 G/DL — SIGNIFICANT CHANGE UP (ref 32–37)
MCV RBC AUTO: 91.1 FL — SIGNIFICANT CHANGE UP (ref 80–94)
MONOCYTES # BLD AUTO: 0.52 K/UL — SIGNIFICANT CHANGE UP (ref 0.1–0.6)
MONOCYTES NFR BLD AUTO: 5.5 % — SIGNIFICANT CHANGE UP (ref 1.7–9.3)
NEUTROPHILS # BLD AUTO: 6.23 K/UL — SIGNIFICANT CHANGE UP (ref 1.4–6.5)
NEUTROPHILS NFR BLD AUTO: 65.8 % — SIGNIFICANT CHANGE UP (ref 42.2–75.2)
NRBC BLD AUTO-RTO: 0 /100 WBCS — SIGNIFICANT CHANGE UP (ref 0–0)
PHOSPHATE SERPL-MCNC: 3.5 MG/DL — SIGNIFICANT CHANGE UP (ref 2.1–4.9)
PLATELET # BLD AUTO: 145 K/UL — SIGNIFICANT CHANGE UP (ref 130–400)
PMV BLD: 11.5 FL — HIGH (ref 7.4–10.4)
POTASSIUM SERPL-MCNC: 3.9 MMOL/L — SIGNIFICANT CHANGE UP (ref 3.5–5)
POTASSIUM SERPL-SCNC: 3.9 MMOL/L — SIGNIFICANT CHANGE UP (ref 3.5–5)
PROT SERPL-MCNC: 5.9 G/DL — LOW (ref 6–8)
RBC # BLD: 4.74 M/UL — SIGNIFICANT CHANGE UP (ref 4.7–6.1)
RBC # FLD: 13 % — SIGNIFICANT CHANGE UP (ref 11.5–14.5)
SODIUM SERPL-SCNC: 140 MMOL/L — SIGNIFICANT CHANGE UP (ref 135–146)
WBC # BLD: 9.47 K/UL — SIGNIFICANT CHANGE UP (ref 4.8–10.8)
WBC # FLD AUTO: 9.47 K/UL — SIGNIFICANT CHANGE UP (ref 4.8–10.8)

## 2025-08-21 PROCEDURE — 99239 HOSP IP/OBS DSCHRG MGMT >30: CPT

## 2025-08-21 PROCEDURE — 71045 X-RAY EXAM CHEST 1 VIEW: CPT | Mod: 26

## 2025-08-21 PROCEDURE — 99233 SBSQ HOSP IP/OBS HIGH 50: CPT

## 2025-08-21 RX ORDER — APIXABAN 5 MG/1
10 TABLET, FILM COATED ORAL EVERY 12 HOURS
Refills: 0 | Status: DISCONTINUED | OUTPATIENT
Start: 2025-08-21 | End: 2025-08-21

## 2025-08-21 RX ORDER — APIXABAN 5 MG/1
1 TABLET, FILM COATED ORAL
Qty: 74 | Refills: 0
Start: 2025-08-21 | End: 2025-09-19

## 2025-08-21 RX ORDER — APIXABAN 5 MG/1
1 TABLET, FILM COATED ORAL
Qty: 60 | Refills: 0
Start: 2025-08-21 | End: 2025-09-19

## 2025-08-21 RX ADMIN — Medication 40 MILLIGRAM(S): at 08:14

## 2025-08-21 RX ADMIN — INSULIN LISPRO 10 UNIT(S): 100 INJECTION, SOLUTION INTRAVENOUS; SUBCUTANEOUS at 09:00

## 2025-08-21 RX ADMIN — APIXABAN 10 MILLIGRAM(S): 5 TABLET, FILM COATED ORAL at 10:02

## 2025-08-21 RX ADMIN — HEPARIN SODIUM 2400 UNIT(S)/HR: 1000 INJECTION INTRAVENOUS; SUBCUTANEOUS at 05:21

## 2025-08-24 LAB
CULTURE RESULTS: SIGNIFICANT CHANGE UP
CULTURE RESULTS: SIGNIFICANT CHANGE UP
SPECIMEN SOURCE: SIGNIFICANT CHANGE UP
SPECIMEN SOURCE: SIGNIFICANT CHANGE UP

## 2025-08-28 DIAGNOSIS — I26.99 OTHER PULMONARY EMBOLISM WITHOUT ACUTE COR PULMONALE: ICD-10-CM

## 2025-08-28 DIAGNOSIS — Z86.711 PERSONAL HISTORY OF PULMONARY EMBOLISM: ICD-10-CM

## 2025-08-28 DIAGNOSIS — E66.9 OBESITY, UNSPECIFIED: ICD-10-CM

## 2025-08-28 DIAGNOSIS — N17.9 ACUTE KIDNEY FAILURE, UNSPECIFIED: ICD-10-CM

## 2025-08-28 DIAGNOSIS — J44.9 CHRONIC OBSTRUCTIVE PULMONARY DISEASE, UNSPECIFIED: ICD-10-CM

## 2025-08-28 DIAGNOSIS — R73.9 HYPERGLYCEMIA, UNSPECIFIED: ICD-10-CM

## 2025-08-28 DIAGNOSIS — I95.9 HYPOTENSION, UNSPECIFIED: ICD-10-CM

## 2025-08-28 DIAGNOSIS — Z96.659 PRESENCE OF UNSPECIFIED ARTIFICIAL KNEE JOINT: ICD-10-CM

## 2025-08-28 DIAGNOSIS — Z79.899 OTHER LONG TERM (CURRENT) DRUG THERAPY: ICD-10-CM

## 2025-08-28 DIAGNOSIS — I25.10 ATHEROSCLEROTIC HEART DISEASE OF NATIVE CORONARY ARTERY WITHOUT ANGINA PECTORIS: ICD-10-CM

## 2025-08-28 DIAGNOSIS — G47.33 OBSTRUCTIVE SLEEP APNEA (ADULT) (PEDIATRIC): ICD-10-CM

## 2025-08-28 DIAGNOSIS — I25.2 OLD MYOCARDIAL INFARCTION: ICD-10-CM

## 2025-08-28 DIAGNOSIS — J96.01 ACUTE RESPIRATORY FAILURE WITH HYPOXIA: ICD-10-CM

## 2025-08-28 DIAGNOSIS — E87.20 ACIDOSIS, UNSPECIFIED: ICD-10-CM

## 2025-08-28 DIAGNOSIS — E78.5 HYPERLIPIDEMIA, UNSPECIFIED: ICD-10-CM

## 2025-08-28 DIAGNOSIS — F17.210 NICOTINE DEPENDENCE, CIGARETTES, UNCOMPLICATED: ICD-10-CM
